# Patient Record
Sex: FEMALE | Race: WHITE | NOT HISPANIC OR LATINO | Employment: OTHER | URBAN - METROPOLITAN AREA
[De-identification: names, ages, dates, MRNs, and addresses within clinical notes are randomized per-mention and may not be internally consistent; named-entity substitution may affect disease eponyms.]

---

## 2019-08-12 ENCOUNTER — OFFICE VISIT (OUTPATIENT)
Dept: CARDIAC SURGERY | Facility: CLINIC | Age: 59
End: 2019-08-12

## 2019-08-12 VITALS
OXYGEN SATURATION: 94 % | SYSTOLIC BLOOD PRESSURE: 150 MMHG | WEIGHT: 178 LBS | TEMPERATURE: 98.9 F | BODY MASS INDEX: 38.4 KG/M2 | HEART RATE: 64 BPM | DIASTOLIC BLOOD PRESSURE: 75 MMHG | HEIGHT: 57 IN

## 2019-08-12 DIAGNOSIS — R91.1 LUNG NODULE: Primary | ICD-10-CM

## 2019-08-12 PROCEDURE — 99205 OFFICE O/P NEW HI 60 MIN: CPT | Performed by: THORACIC SURGERY (CARDIOTHORACIC VASCULAR SURGERY)

## 2019-08-12 RX ORDER — FLUTICASONE PROPIONATE 50 MCG
1 SPRAY, SUSPENSION (ML) NASAL EVERY MORNING
COMMUNITY
Start: 2019-05-13 | End: 2021-07-16

## 2019-08-12 RX ORDER — HYDROCODONE BITARTRATE AND ACETAMINOPHEN 10; 325 MG/1; MG/1
TABLET ORAL
Refills: 0 | COMMUNITY
Start: 2019-07-08

## 2019-08-12 RX ORDER — MONTELUKAST SODIUM 10 MG/1
10 TABLET ORAL NIGHTLY
COMMUNITY
Start: 2019-07-25 | End: 2021-07-16

## 2019-08-12 RX ORDER — DOCUSATE SODIUM 250 MG
250 CAPSULE ORAL NIGHTLY
COMMUNITY
End: 2021-07-16

## 2019-08-12 RX ORDER — CITALOPRAM 20 MG/1
20 TABLET ORAL EVERY MORNING
COMMUNITY
Start: 2019-07-25 | End: 2021-03-29

## 2019-08-12 RX ORDER — PRAMIPEXOLE DIHYDROCHLORIDE 0.25 MG/1
0.5 TABLET ORAL NIGHTLY PRN
COMMUNITY
Start: 2019-06-04 | End: 2021-07-16

## 2019-08-12 RX ORDER — KRILL/OM-3/DHA/EPA/PHOSPHO/AST 500-110 MG
520 CAPSULE ORAL EVERY MORNING
COMMUNITY
End: 2021-03-29

## 2019-08-12 RX ORDER — POTASSIUM CHLORIDE 750 MG/1
10 CAPSULE, EXTENDED RELEASE ORAL 2 TIMES DAILY
COMMUNITY

## 2019-08-12 RX ORDER — PANTOPRAZOLE SODIUM 20 MG/1
40 TABLET, DELAYED RELEASE ORAL EVERY MORNING
COMMUNITY
Start: 2019-07-02 | End: 2021-08-13

## 2019-08-12 RX ORDER — MELOXICAM 15 MG/1
15 TABLET ORAL DAILY
COMMUNITY
Start: 2019-06-04 | End: 2021-07-16

## 2019-08-12 RX ORDER — CHOLECALCIFEROL (VITAMIN D3) 125 MCG
2000 CAPSULE ORAL DAILY
COMMUNITY

## 2019-08-12 RX ORDER — RANITIDINE 150 MG/1
150 TABLET ORAL AS NEEDED
COMMUNITY
Start: 2019-07-25 | End: 2021-03-29

## 2019-08-12 RX ORDER — FUROSEMIDE 20 MG/1
20 TABLET ORAL EVERY MORNING
COMMUNITY
Start: 2019-06-04

## 2019-08-12 NOTE — PROGRESS NOTES
08/12/2019  Patient Information  Fabiana Brody                                                                                          [address] [facility] [address]   1960  'PCP/Referring Physician'  Andrea Lowery MD  [phone]  Marry Colunga,*  [phone]  Chief Complaint   Patient presents with   • Consult     referred by Dr. Marry Colunga   • Lung Nodule       History of Present Illness:   The patient is a 59-year-old white female who has been a heavy smoker throughout her entire life.  She had a screening CT done approximately 6 months ago which revealed a 0.6 cm right lower lobe nodule.  This has been re-x-rayed and has grown to 1.1 cm.  The PET scan is intensely positive with SUV greater than 6.  She quit smoking less than a month ago.  She has been referred to me for consideration of surgical management of this.  She denies hemoptysis.  She denies exposure to carcinogens other than tobacco.  She denies a history of exposure to infectious disease.      Patient Active Problem List   Diagnosis   • Lung nodule     Past Medical History:   Diagnosis Date   • Anxiety    • Arthritis    • Cancer (CMS/HCC)    • Depression    • Diabetes mellitus (CMS/HCC)     type 2   • GERD (gastroesophageal reflux disease)    • Hypertension    • Peptic ulceration    • Pneumonia    • Sleep apnea      Past Surgical History:   Procedure Laterality Date   • CATARACT EXTRACTION, BILATERAL     • CHOLECYSTECTOMY     • FETAL SURGERY FOR CONGENITAL HERNIA      unbilical   • JOINT REPLACEMENT Left     complete   • OTHER SURGICAL HISTORY      begign basal cell carcinoma removed from nose   • OTHER SURGICAL HISTORY      added plate & pins to right elbow to keep in place   • OTHER SURGICAL HISTORY      had bladder stim battery replaced   • POLYPECTOMY      removed from vocal cords & membranes stripped from them       Current Outpatient Medications:   •  carbidopa-levodopa (SINEMET)  MG per tablet, , Disp: ,  Rfl:   •  cholecalciferol (VITAMIN D3) 1000 units tablet, Take 2,000 Units by mouth Daily., Disp: , Rfl:   •  citalopram (CeleXA) 20 MG tablet, , Disp: , Rfl:   •  Cranberry 1000 MG capsule, Take 2,000 mg by mouth., Disp: , Rfl:   •  docusate sodium (COLACE) 250 MG capsule, Take 250 mg by mouth Daily., Disp: , Rfl:   •  fluticasone (FLONASE) 50 MCG/ACT nasal spray, , Disp: , Rfl:   •  furosemide (LASIX) 20 MG tablet, , Disp: , Rfl:   •  HYDROcodone-acetaminophen (NORCO)  MG per tablet, TAKE 1 TABLET BY MOUTH EVERY 8 HOURS AS NEEDED FOR 30 DAYS, Disp: , Rfl: 0  •  Krill Oil (OMEGA-3) 500 MG capsule, Take 520 mg by mouth., Disp: , Rfl:   •  meloxicam (MOBIC) 15 MG tablet, , Disp: , Rfl:   •  montelukast (SINGULAIR) 10 MG tablet, , Disp: , Rfl:   •  pantoprazole (PROTONIX) 20 MG EC tablet, , Disp: , Rfl:   •  potassium chloride (MICRO-K) 8 MEQ CR capsule, Take 10 mEq by mouth 2 (Two) Times a Day., Disp: , Rfl:   •  pramipexole (MIRAPEX) 0.25 MG tablet, , Disp: , Rfl:   •  raNITIdine (ZANTAC) 150 MG tablet, , Disp: , Rfl:   •  Red Yeast Rice Extract (RED YEAST RICE PO), Take  by mouth., Disp: , Rfl:   Allergies   Allergen Reactions   • Erythromycin Hives and Nausea Only   • Penicillins Hives and Nausea Only   • Pennsaid [Diclofenac Sodium] Other (See Comments)     Skin peels, burns & rolls off.   • Tape Other (See Comments)     Tears skin and creates wounds   • Tetracyclines & Related Hives and Nausea Only     Social History     Socioeconomic History   • Marital status:      Spouse name: Not on file   • Number of children: 0   • Years of education: Not on file   • Highest education level: Not on file   Occupational History     Employer: DISABLED   Tobacco Use   • Smoking status: Former Smoker     Packs/day: 1.50     Last attempt to quit: 2019     Years since quittin.0   • Tobacco comment: 40 + years   Substance and Sexual Activity   • Alcohol use: No     Frequency: Never   • Drug use: No   Social  History Narrative    Lives in [place], [place]     Family History   Problem Relation Age of Onset   • Diabetes Mother    • Heart failure Mother    • Cancer Mother    • Hypertension Mother    • Depression Mother    • Urinary tract infection Mother    • Stroke Mother    • Stroke Father    • Cancer Father    • Hypertension Father    • Kidney disease Father      Review of Systems   Constitution: Positive for malaise/fatigue. Negative for chills, diaphoresis, fever, weakness, night sweats, weight gain and weight loss.   HENT: Positive for hearing loss (bilateral). Negative for congestion, ear pain, nosebleeds and sore throat.    Eyes: Negative for blurred vision and double vision.   Cardiovascular: Positive for leg swelling. Negative for chest pain, claudication, dyspnea on exertion, near-syncope, palpitations and syncope.   Respiratory: Positive for cough and shortness of breath. Negative for hemoptysis and wheezing.    Hematologic/Lymphatic: Does not bruise/bleed easily.   Skin: Positive for itching. Negative for poor wound healing and rash.   Musculoskeletal: Positive for arthritis. Negative for back pain, falls, joint pain, joint swelling, muscle cramps, muscle weakness and neck pain.   Gastrointestinal: Positive for abdominal pain. Negative for constipation, diarrhea, dysphagia, hematochezia, nausea and vomiting.   Genitourinary: Positive for bladder incontinence. Negative for frequency, hematuria, hesitancy, incomplete emptying and urgency.   Neurological: Positive for loss of balance and tremors. Negative for dizziness, headaches, light-headedness, numbness and seizures.   Psychiatric/Behavioral: Positive for depression. Negative for suicidal ideas. The patient is nervous/anxious.    Allergic/Immunologic: Positive for environmental allergies. Negative for HIV exposure.     Vitals:    08/12/19 1343   BP: 150/75   BP Location: Right arm   Patient Position: Sitting   Pulse: 64   Temp: 98.9 °F (37.2 °C)   SpO2: 94%  "  Weight: 80.7 kg (178 lb)   Height: 144.8 cm (57\")      Physical Exam   Constitutional: She is oriented to person, place, and time. She appears well-developed and well-nourished. No distress.   HENT:   Head: Normocephalic.   Eyes: EOM are normal. Pupils are equal, round, and reactive to light.   Neck: Normal range of motion. Carotid bruit is not present. No thyromegaly present.   Cardiovascular: Normal rate and regular rhythm. Exam reveals no gallop and no friction rub.   No murmur heard.  Pulmonary/Chest: She has no wheezes. She has no rales.   Abdominal: Soft. Bowel sounds are normal. She exhibits no distension and no mass. There is no hepatomegaly. There is no tenderness.   Musculoskeletal: Normal range of motion. She exhibits no deformity.   Neurological: She is alert and oriented to person, place, and time. She has normal strength. No cranial nerve deficit or sensory deficit.   Skin: No bruising and no petechiae noted. No cyanosis. Nails show no clubbing.   Psychiatric: She has a normal mood and affect.     Labs/Imaging:  I obtained and reviewed medical records from Dr. Colunga including her CT scan as well as her PET scan.    Assessment/Plan:   The patient is a 59 white female referred for right lower lobe mass.  I have obtained and reviewed her CT scan, as well as her PET scan.  I concur with the radiologist that she has a 1.1 cm right lower lobe mass, which is intensely positive on PET scan.  This certainly is very concerning for malignancy, particular with her smoking history.  I think it has to be surgically excised.  I do not think a needle biopsy will add anything to the situation.  Her PET scan reveals no evidence of any spread.  I have explained the operation, risks  and alternatives.  She is in a wheelchair a great deal of the time.  Obviously she will be an elevated surgical risk, but I think that this is her best option.  She appears to understand the risks which includes risk to her life, " bleeding, infection, organ failure, chronic pain, and other risk factors.  She desires to proceed.    Patient Active Problem List   Diagnosis   • Lung nodule     CC: MD Marry Mae MD Debbie Moore, , editing for Phuc Cox M.D.    I, Phuc Cox MD, have read and agree with the editing done by Kerry Padilla, .

## 2019-08-13 ENCOUNTER — PREP FOR SURGERY (OUTPATIENT)
Dept: OTHER | Facility: HOSPITAL | Age: 59
End: 2019-08-13

## 2019-08-13 DIAGNOSIS — R91.1 LUNG NODULE: Primary | ICD-10-CM

## 2019-08-13 DIAGNOSIS — Z00.6 EXAMINATION FOR NORMAL COMPARISON FOR CLINICAL RESEARCH: Primary | ICD-10-CM

## 2019-08-13 RX ORDER — CHLORHEXIDINE GLUCONATE 500 MG/1
1 CLOTH TOPICAL EVERY 12 HOURS PRN
Status: CANCELLED | OUTPATIENT
Start: 2019-08-28

## 2019-08-28 ENCOUNTER — APPOINTMENT (OUTPATIENT)
Dept: PREADMISSION TESTING | Facility: HOSPITAL | Age: 59
End: 2019-08-28

## 2019-09-02 ENCOUNTER — APPOINTMENT (OUTPATIENT)
Dept: PREADMISSION TESTING | Facility: HOSPITAL | Age: 59
End: 2019-09-02

## 2019-09-02 ENCOUNTER — ANESTHESIA EVENT (OUTPATIENT)
Dept: PERIOP | Facility: HOSPITAL | Age: 59
End: 2019-09-02

## 2019-09-02 ENCOUNTER — HOSPITAL ENCOUNTER (OUTPATIENT)
Dept: GENERAL RADIOLOGY | Facility: HOSPITAL | Age: 59
Discharge: HOME | End: 2019-09-02
Admitting: PHYSICIAN ASSISTANT

## 2019-09-02 VITALS — WEIGHT: 178.6 LBS | BODY MASS INDEX: 37.49 KG/M2 | HEIGHT: 58 IN | OXYGEN SATURATION: 95 %

## 2019-09-02 DIAGNOSIS — R91.1 LUNG NODULE: ICD-10-CM

## 2019-09-02 LAB
ABO GROUP BLD: NORMAL
ALBUMIN SERPL-MCNC: 4.5 G/DL (ref 3.5–5.2)
ALP SERPL-CCNC: 140 U/L (ref 39–117)
ALT SERPL W P-5'-P-CCNC: 6 U/L (ref 1–33)
ANION GAP SERPL CALCULATED.3IONS-SCNC: 11 MMOL/L (ref 5–15)
AST SERPL-CCNC: 16 U/L (ref 1–32)
BASOPHILS # BLD AUTO: 0.03 10*3/MM3 (ref 0–0.2)
BASOPHILS NFR BLD AUTO: 0.4 % (ref 0–1.5)
BILIRUB CONJ SERPL-MCNC: <0.2 MG/DL (ref 0.2–0.3)
BILIRUB INDIRECT SERPL-MCNC: ABNORMAL MG/DL
BILIRUB SERPL-MCNC: 0.3 MG/DL (ref 0.2–1.2)
BLD GP AB SCN SERPL QL: NEGATIVE
BUN BLD-MCNC: 16 MG/DL (ref 8–23)
BUN/CREAT SERPL: 21.9 (ref 7–25)
CALCIUM SPEC-SCNC: 9.3 MG/DL (ref 8.6–10.5)
CHLORIDE SERPL-SCNC: 96 MMOL/L (ref 98–107)
CO2 SERPL-SCNC: 26 MMOL/L (ref 22–29)
CREAT BLD-MCNC: 0.73 MG/DL (ref 0.57–1)
DEPRECATED RDW RBC AUTO: 44.2 FL (ref 37–54)
EOSINOPHIL # BLD AUTO: 0.27 10*3/MM3 (ref 0–0.4)
EOSINOPHIL NFR BLD AUTO: 3.5 % (ref 0.3–6.2)
ERYTHROCYTE [DISTWIDTH] IN BLOOD BY AUTOMATED COUNT: 13.5 % (ref 12.3–15.4)
GFR SERPL CREATININE-BSD FRML MDRD: 81 ML/MIN/1.73
GLUCOSE BLD-MCNC: 90 MG/DL (ref 65–99)
HBA1C MFR BLD: 5.6 % (ref 4.8–5.6)
HCT VFR BLD AUTO: 38 % (ref 34–46.6)
HGB BLD-MCNC: 12.6 G/DL (ref 12–15.9)
IMM GRANULOCYTES # BLD AUTO: 0.03 10*3/MM3 (ref 0–0.05)
IMM GRANULOCYTES NFR BLD AUTO: 0.4 % (ref 0–0.5)
INR PPP: 1.02 (ref 0.85–1.16)
LYMPHOCYTES # BLD AUTO: 2.36 10*3/MM3 (ref 0.7–3.1)
LYMPHOCYTES NFR BLD AUTO: 30.6 % (ref 19.6–45.3)
MCH RBC QN AUTO: 29.5 PG (ref 26.6–33)
MCHC RBC AUTO-ENTMCNC: 33.2 G/DL (ref 31.5–35.7)
MCV RBC AUTO: 89 FL (ref 79–97)
MONOCYTES # BLD AUTO: 0.79 10*3/MM3 (ref 0.1–0.9)
MONOCYTES NFR BLD AUTO: 10.2 % (ref 5–12)
NEUTROPHILS # BLD AUTO: 4.23 10*3/MM3 (ref 1.7–7)
NEUTROPHILS NFR BLD AUTO: 54.9 % (ref 42.7–76)
NRBC BLD AUTO-RTO: 0 /100 WBC (ref 0–0.2)
PLATELET # BLD AUTO: 246 10*3/MM3 (ref 140–450)
PMV BLD AUTO: 8.8 FL (ref 6–12)
POTASSIUM BLD-SCNC: 4.6 MMOL/L (ref 3.5–5.2)
PROT SERPL-MCNC: 6.5 G/DL (ref 6–8.5)
PROTHROMBIN TIME: 12.9 SECONDS (ref 11.2–14.3)
RBC # BLD AUTO: 4.27 10*6/MM3 (ref 3.77–5.28)
RH BLD: POSITIVE
SODIUM BLD-SCNC: 133 MMOL/L (ref 136–145)
T&S EXPIRATION DATE: NORMAL
WBC NRBC COR # BLD: 7.71 10*3/MM3 (ref 3.4–10.8)

## 2019-09-02 PROCEDURE — 93005 ELECTROCARDIOGRAM TRACING: CPT

## 2019-09-02 PROCEDURE — 86850 RBC ANTIBODY SCREEN: CPT | Performed by: PHYSICIAN ASSISTANT

## 2019-09-02 PROCEDURE — 93010 ELECTROCARDIOGRAM REPORT: CPT | Performed by: INTERNAL MEDICINE

## 2019-09-02 PROCEDURE — 85025 COMPLETE CBC W/AUTO DIFF WBC: CPT | Performed by: PHYSICIAN ASSISTANT

## 2019-09-02 PROCEDURE — 36415 COLL VENOUS BLD VENIPUNCTURE: CPT

## 2019-09-02 PROCEDURE — 83036 HEMOGLOBIN GLYCOSYLATED A1C: CPT | Performed by: PHYSICIAN ASSISTANT

## 2019-09-02 PROCEDURE — 80076 HEPATIC FUNCTION PANEL: CPT | Performed by: PHYSICIAN ASSISTANT

## 2019-09-02 PROCEDURE — 71046 X-RAY EXAM CHEST 2 VIEWS: CPT

## 2019-09-02 PROCEDURE — 85610 PROTHROMBIN TIME: CPT | Performed by: PHYSICIAN ASSISTANT

## 2019-09-02 PROCEDURE — 86901 BLOOD TYPING SEROLOGIC RH(D): CPT | Performed by: PHYSICIAN ASSISTANT

## 2019-09-02 PROCEDURE — 86900 BLOOD TYPING SEROLOGIC ABO: CPT | Performed by: PHYSICIAN ASSISTANT

## 2019-09-02 PROCEDURE — 80048 BASIC METABOLIC PNL TOTAL CA: CPT | Performed by: PHYSICIAN ASSISTANT

## 2019-09-02 PROCEDURE — 86923 COMPATIBILITY TEST ELECTRIC: CPT

## 2019-09-02 RX ORDER — FAMOTIDINE 10 MG/ML
20 INJECTION, SOLUTION INTRAVENOUS ONCE
Status: CANCELLED | OUTPATIENT
Start: 2019-09-02 | End: 2019-09-02

## 2019-09-02 RX ORDER — CETIRIZINE HYDROCHLORIDE 10 MG/1
10 TABLET ORAL NIGHTLY
COMMUNITY
End: 2021-08-13

## 2019-09-02 RX ORDER — ALBUTEROL SULFATE 90 UG/1
2 AEROSOL, METERED RESPIRATORY (INHALATION) EVERY 4 HOURS PRN
COMMUNITY

## 2019-09-02 RX ORDER — CYCLOBENZAPRINE HCL 10 MG
10 TABLET ORAL 3 TIMES DAILY PRN
COMMUNITY

## 2019-09-03 ENCOUNTER — APPOINTMENT (OUTPATIENT)
Dept: GENERAL RADIOLOGY | Facility: HOSPITAL | Age: 59
End: 2019-09-03

## 2019-09-03 ENCOUNTER — APPOINTMENT (OUTPATIENT)
Dept: PULMONOLOGY | Facility: HOSPITAL | Age: 59
End: 2019-09-03

## 2019-09-03 ENCOUNTER — HOSPITAL ENCOUNTER (INPATIENT)
Facility: HOSPITAL | Age: 59
LOS: 6 days | Discharge: HOME HEALTH | End: 2019-09-09
Attending: THORACIC SURGERY (CARDIOTHORACIC VASCULAR SURGERY) | Admitting: THORACIC SURGERY (CARDIOTHORACIC VASCULAR SURGERY)

## 2019-09-03 ENCOUNTER — ANESTHESIA (OUTPATIENT)
Dept: PERIOP | Facility: HOSPITAL | Age: 59
End: 2019-09-03

## 2019-09-03 DIAGNOSIS — C34.91 SQUAMOUS CELL CARCINOMA OF LUNG, STAGE I, RIGHT (HCC): ICD-10-CM

## 2019-09-03 DIAGNOSIS — R91.1 LUNG NODULE: ICD-10-CM

## 2019-09-03 DIAGNOSIS — J44.9 CHRONIC OBSTRUCTIVE PULMONARY DISEASE, UNSPECIFIED COPD TYPE (HCC): Primary | ICD-10-CM

## 2019-09-03 PROBLEM — E66.9 CLASS 2 OBESITY IN ADULT: Status: ACTIVE | Noted: 2019-09-03

## 2019-09-03 PROBLEM — G47.33 OSA (OBSTRUCTIVE SLEEP APNEA): Status: ACTIVE | Noted: 2019-09-03

## 2019-09-03 PROBLEM — Z78.9 ELECTRONIC CIGARETTE USE: Status: ACTIVE | Noted: 2019-09-03

## 2019-09-03 PROBLEM — Z99.3 WHEELCHAIR DEPENDENCE: Status: ACTIVE | Noted: 2019-09-03

## 2019-09-03 PROBLEM — Z72.0 TOBACCO USE: Status: ACTIVE | Noted: 2019-09-03

## 2019-09-03 PROBLEM — I10 HYPERTENSION: Status: ACTIVE | Noted: 2019-09-03

## 2019-09-03 LAB
ABO GROUP BLD: NORMAL
GLUCOSE BLDC GLUCOMTR-MCNC: 105 MG/DL (ref 70–130)
GLUCOSE BLDC GLUCOMTR-MCNC: 173 MG/DL (ref 70–130)
RH BLD: POSITIVE

## 2019-09-03 PROCEDURE — 86900 BLOOD TYPING SEROLOGIC ABO: CPT

## 2019-09-03 PROCEDURE — 0BTF0ZZ RESECTION OF RIGHT LOWER LUNG LOBE, OPEN APPROACH: ICD-10-PCS | Performed by: THORACIC SURGERY (CARDIOTHORACIC VASCULAR SURGERY)

## 2019-09-03 PROCEDURE — 25010000002 DEXAMETHASONE PER 1 MG: Performed by: NURSE ANESTHETIST, CERTIFIED REGISTERED

## 2019-09-03 PROCEDURE — 0BJ08ZZ INSPECTION OF TRACHEOBRONCHIAL TREE, VIA NATURAL OR ARTIFICIAL OPENING ENDOSCOPIC: ICD-10-PCS | Performed by: THORACIC SURGERY (CARDIOTHORACIC VASCULAR SURGERY)

## 2019-09-03 PROCEDURE — 32480 PARTIAL REMOVAL OF LUNG: CPT | Performed by: THORACIC SURGERY (CARDIOTHORACIC VASCULAR SURGERY)

## 2019-09-03 PROCEDURE — 07B70ZX EXCISION OF THORAX LYMPHATIC, OPEN APPROACH, DIAGNOSTIC: ICD-10-PCS | Performed by: THORACIC SURGERY (CARDIOTHORACIC VASCULAR SURGERY)

## 2019-09-03 PROCEDURE — 86901 BLOOD TYPING SEROLOGIC RH(D): CPT

## 2019-09-03 PROCEDURE — 25010000003 MORPHINE PER 10 MG: Performed by: NURSE ANESTHETIST, CERTIFIED REGISTERED

## 2019-09-03 PROCEDURE — 25010000002 ONDANSETRON PER 1 MG: Performed by: NURSE ANESTHETIST, CERTIFIED REGISTERED

## 2019-09-03 PROCEDURE — 25010000002 VANCOMYCIN 10 G RECONSTITUTED SOLUTION: Performed by: PHYSICIAN ASSISTANT

## 2019-09-03 PROCEDURE — 88341 IMHCHEM/IMCYTCHM EA ADD ANTB: CPT | Performed by: THORACIC SURGERY (CARDIOTHORACIC VASCULAR SURGERY)

## 2019-09-03 PROCEDURE — 88334 PATH CONSLTJ SURG CYTO XM EA: CPT | Performed by: THORACIC SURGERY (CARDIOTHORACIC VASCULAR SURGERY)

## 2019-09-03 PROCEDURE — 82962 GLUCOSE BLOOD TEST: CPT

## 2019-09-03 PROCEDURE — 71045 X-RAY EXAM CHEST 1 VIEW: CPT

## 2019-09-03 PROCEDURE — 94010 BREATHING CAPACITY TEST: CPT | Performed by: INTERNAL MEDICINE

## 2019-09-03 PROCEDURE — 99232 SBSQ HOSP IP/OBS MODERATE 35: CPT | Performed by: INTERNAL MEDICINE

## 2019-09-03 PROCEDURE — 25010000002 MIDAZOLAM PER 1 MG: Performed by: NURSE ANESTHETIST, CERTIFIED REGISTERED

## 2019-09-03 PROCEDURE — 0BBF0ZZ EXCISION OF RIGHT LOWER LUNG LOBE, OPEN APPROACH: ICD-10-PCS | Performed by: THORACIC SURGERY (CARDIOTHORACIC VASCULAR SURGERY)

## 2019-09-03 PROCEDURE — 25010000002 MORPHINE PER 10 MG: Performed by: THORACIC SURGERY (CARDIOTHORACIC VASCULAR SURGERY)

## 2019-09-03 PROCEDURE — C1729 CATH, DRAINAGE: HCPCS | Performed by: THORACIC SURGERY (CARDIOTHORACIC VASCULAR SURGERY)

## 2019-09-03 PROCEDURE — 94010 BREATHING CAPACITY TEST: CPT

## 2019-09-03 PROCEDURE — 88307 TISSUE EXAM BY PATHOLOGIST: CPT | Performed by: THORACIC SURGERY (CARDIOTHORACIC VASCULAR SURGERY)

## 2019-09-03 PROCEDURE — 88342 IMHCHEM/IMCYTCHM 1ST ANTB: CPT | Performed by: THORACIC SURGERY (CARDIOTHORACIC VASCULAR SURGERY)

## 2019-09-03 PROCEDURE — 88331 PATH CONSLTJ SURG 1 BLK 1SPC: CPT | Performed by: PATHOLOGY

## 2019-09-03 PROCEDURE — 25010000002 MORPHINE PER 10 MG: Performed by: NURSE ANESTHETIST, CERTIFIED REGISTERED

## 2019-09-03 PROCEDURE — 25010000002 PHENYLEPHRINE PER 1 ML: Performed by: NURSE ANESTHETIST, CERTIFIED REGISTERED

## 2019-09-03 PROCEDURE — 94799 UNLISTED PULMONARY SVC/PX: CPT

## 2019-09-03 PROCEDURE — 38746 REMOVE THORACIC LYMPH NODES: CPT | Performed by: THORACIC SURGERY (CARDIOTHORACIC VASCULAR SURGERY)

## 2019-09-03 PROCEDURE — C1755 CATHETER, INTRASPINAL: HCPCS | Performed by: ANESTHESIOLOGY

## 2019-09-03 PROCEDURE — 32507 WEDGE RESECT OF LUNG DIAG: CPT | Performed by: THORACIC SURGERY (CARDIOTHORACIC VASCULAR SURGERY)

## 2019-09-03 PROCEDURE — 88309 TISSUE EXAM BY PATHOLOGIST: CPT | Performed by: THORACIC SURGERY (CARDIOTHORACIC VASCULAR SURGERY)

## 2019-09-03 PROCEDURE — 25010000002 FENTANYL CITRATE (PF) 100 MCG/2ML SOLUTION: Performed by: NURSE ANESTHETIST, CERTIFIED REGISTERED

## 2019-09-03 PROCEDURE — 31622 DX BRONCHOSCOPE/WASH: CPT | Performed by: THORACIC SURGERY (CARDIOTHORACIC VASCULAR SURGERY)

## 2019-09-03 PROCEDURE — 25010000002 NEOSTIGMINE 10 MG/10ML SOLUTION: Performed by: NURSE ANESTHETIST, CERTIFIED REGISTERED

## 2019-09-03 PROCEDURE — 88305 TISSUE EXAM BY PATHOLOGIST: CPT | Performed by: THORACIC SURGERY (CARDIOTHORACIC VASCULAR SURGERY)

## 2019-09-03 PROCEDURE — 25010000002 PROPOFOL 10 MG/ML EMULSION: Performed by: NURSE ANESTHETIST, CERTIFIED REGISTERED

## 2019-09-03 DEVICE — ARTICULATING RELOAD WITH TRI-STAPLE TECHNOLOGY
Type: IMPLANTABLE DEVICE | Site: BRONCHUS | Status: FUNCTIONAL
Brand: ENDO GIA

## 2019-09-03 DEVICE — CURVED TIP INTELLIGENT RELOAD AND INTRODUCER
Type: IMPLANTABLE DEVICE | Site: BRONCHUS | Status: FUNCTIONAL
Brand: TRI-STAPLE 2.0

## 2019-09-03 DEVICE — STAPLER WITH DST SERIES TECHNOLOGY
Type: IMPLANTABLE DEVICE | Site: BRONCHUS | Status: FUNCTIONAL
Brand: TA

## 2019-09-03 RX ORDER — ALBUTEROL SULFATE 2.5 MG/3ML
2.5 SOLUTION RESPIRATORY (INHALATION) ONCE AS NEEDED
Status: DISCONTINUED | OUTPATIENT
Start: 2019-09-03 | End: 2019-09-07

## 2019-09-03 RX ORDER — CETIRIZINE HYDROCHLORIDE 10 MG/1
10 TABLET ORAL NIGHTLY
Status: DISCONTINUED | OUTPATIENT
Start: 2019-09-03 | End: 2019-09-09 | Stop reason: HOSPADM

## 2019-09-03 RX ORDER — FAMOTIDINE 20 MG/1
20 TABLET, FILM COATED ORAL DAILY
Status: DISCONTINUED | OUTPATIENT
Start: 2019-09-03 | End: 2019-09-05

## 2019-09-03 RX ORDER — ONDANSETRON 2 MG/ML
4 INJECTION INTRAMUSCULAR; INTRAVENOUS EVERY 6 HOURS PRN
Status: DISCONTINUED | OUTPATIENT
Start: 2019-09-03 | End: 2019-09-09 | Stop reason: HOSPADM

## 2019-09-03 RX ORDER — SODIUM CHLORIDE 0.9 % (FLUSH) 0.9 %
10 SYRINGE (ML) INJECTION EVERY 12 HOURS SCHEDULED
Status: DISCONTINUED | OUTPATIENT
Start: 2019-09-03 | End: 2019-09-09 | Stop reason: HOSPADM

## 2019-09-03 RX ORDER — DEXAMETHASONE SODIUM PHOSPHATE 4 MG/ML
INJECTION, SOLUTION INTRA-ARTICULAR; INTRALESIONAL; INTRAMUSCULAR; INTRAVENOUS; SOFT TISSUE AS NEEDED
Status: DISCONTINUED | OUTPATIENT
Start: 2019-09-03 | End: 2019-09-03 | Stop reason: SURG

## 2019-09-03 RX ORDER — NEOSTIGMINE METHYLSULFATE 1 MG/ML
INJECTION, SOLUTION INTRAVENOUS AS NEEDED
Status: DISCONTINUED | OUTPATIENT
Start: 2019-09-03 | End: 2019-09-03 | Stop reason: SURG

## 2019-09-03 RX ORDER — MIDAZOLAM HYDROCHLORIDE 1 MG/ML
INJECTION INTRAMUSCULAR; INTRAVENOUS AS NEEDED
Status: DISCONTINUED | OUTPATIENT
Start: 2019-09-03 | End: 2019-09-03 | Stop reason: SURG

## 2019-09-03 RX ORDER — FAMOTIDINE 20 MG/1
20 TABLET, FILM COATED ORAL ONCE
Status: COMPLETED | OUTPATIENT
Start: 2019-09-03 | End: 2019-09-03

## 2019-09-03 RX ORDER — GLYCOPYRROLATE 0.2 MG/ML
INJECTION INTRAMUSCULAR; INTRAVENOUS AS NEEDED
Status: DISCONTINUED | OUTPATIENT
Start: 2019-09-03 | End: 2019-09-03 | Stop reason: SURG

## 2019-09-03 RX ORDER — HYDROMORPHONE HYDROCHLORIDE 1 MG/ML
0.25 INJECTION, SOLUTION INTRAMUSCULAR; INTRAVENOUS; SUBCUTANEOUS
Status: DISPENSED | OUTPATIENT
Start: 2019-09-03 | End: 2019-09-07

## 2019-09-03 RX ORDER — SODIUM CHLORIDE 0.9 % (FLUSH) 0.9 %
1-10 SYRINGE (ML) INJECTION AS NEEDED
Status: DISCONTINUED | OUTPATIENT
Start: 2019-09-03 | End: 2019-09-09 | Stop reason: HOSPADM

## 2019-09-03 RX ORDER — NALOXONE HCL 0.4 MG/ML
0.4 VIAL (ML) INJECTION
Status: DISCONTINUED | OUTPATIENT
Start: 2019-09-03 | End: 2019-09-09 | Stop reason: HOSPADM

## 2019-09-03 RX ORDER — FENTANYL CITRATE 50 UG/ML
50 INJECTION, SOLUTION INTRAMUSCULAR; INTRAVENOUS
Status: DISCONTINUED | OUTPATIENT
Start: 2019-09-03 | End: 2019-09-07

## 2019-09-03 RX ORDER — SODIUM CHLORIDE 9 MG/ML
30 INJECTION, SOLUTION INTRAVENOUS CONTINUOUS
Status: DISCONTINUED | OUTPATIENT
Start: 2019-09-03 | End: 2019-09-06

## 2019-09-03 RX ORDER — DOCUSATE SODIUM 100 MG/1
200 CAPSULE, LIQUID FILLED ORAL NIGHTLY
Status: DISCONTINUED | OUTPATIENT
Start: 2019-09-03 | End: 2019-09-09 | Stop reason: HOSPADM

## 2019-09-03 RX ORDER — CITALOPRAM 20 MG/1
20 TABLET ORAL EVERY MORNING
Status: DISCONTINUED | OUTPATIENT
Start: 2019-09-04 | End: 2019-09-09 | Stop reason: HOSPADM

## 2019-09-03 RX ORDER — MONTELUKAST SODIUM 10 MG/1
10 TABLET ORAL NIGHTLY
Status: DISCONTINUED | OUTPATIENT
Start: 2019-09-03 | End: 2019-09-09 | Stop reason: HOSPADM

## 2019-09-03 RX ORDER — LIDOCAINE HYDROCHLORIDE 10 MG/ML
INJECTION, SOLUTION EPIDURAL; INFILTRATION; INTRACAUDAL; PERINEURAL AS NEEDED
Status: DISCONTINUED | OUTPATIENT
Start: 2019-09-03 | End: 2019-09-03 | Stop reason: SURG

## 2019-09-03 RX ORDER — ONDANSETRON 4 MG/1
4 TABLET, FILM COATED ORAL EVERY 6 HOURS PRN
Status: DISCONTINUED | OUTPATIENT
Start: 2019-09-03 | End: 2019-09-07

## 2019-09-03 RX ORDER — FUROSEMIDE 20 MG/1
20 TABLET ORAL EVERY MORNING
Status: DISCONTINUED | OUTPATIENT
Start: 2019-09-04 | End: 2019-09-09 | Stop reason: HOSPADM

## 2019-09-03 RX ORDER — OXYCODONE HYDROCHLORIDE AND ACETAMINOPHEN 5; 325 MG/1; MG/1
1 TABLET ORAL EVERY 4 HOURS PRN
Status: DISCONTINUED | OUTPATIENT
Start: 2019-09-03 | End: 2019-09-09 | Stop reason: HOSPADM

## 2019-09-03 RX ORDER — ALBUTEROL SULFATE 2.5 MG/3ML
2.5 SOLUTION RESPIRATORY (INHALATION) EVERY 4 HOURS PRN
Status: DISCONTINUED | OUTPATIENT
Start: 2019-09-03 | End: 2019-09-09 | Stop reason: HOSPADM

## 2019-09-03 RX ORDER — SODIUM CHLORIDE 0.9 % (FLUSH) 0.9 %
3 SYRINGE (ML) INJECTION EVERY 12 HOURS SCHEDULED
Status: DISCONTINUED | OUTPATIENT
Start: 2019-09-03 | End: 2019-09-03 | Stop reason: HOSPADM

## 2019-09-03 RX ORDER — HYDROCODONE BITARTRATE AND ACETAMINOPHEN 7.5; 325 MG/1; MG/1
1 TABLET ORAL EVERY 6 HOURS PRN
Status: DISCONTINUED | OUTPATIENT
Start: 2019-09-03 | End: 2019-09-09 | Stop reason: HOSPADM

## 2019-09-03 RX ORDER — ONDANSETRON 2 MG/ML
4 INJECTION INTRAMUSCULAR; INTRAVENOUS EVERY 6 HOURS PRN
Status: DISCONTINUED | OUTPATIENT
Start: 2019-09-03 | End: 2019-09-07

## 2019-09-03 RX ORDER — LIDOCAINE HYDROCHLORIDE AND EPINEPHRINE 10; 10 MG/ML; UG/ML
INJECTION, SOLUTION INFILTRATION; PERINEURAL AS NEEDED
Status: DISCONTINUED | OUTPATIENT
Start: 2019-09-03 | End: 2019-09-03 | Stop reason: SURG

## 2019-09-03 RX ORDER — MAGNESIUM HYDROXIDE 1200 MG/15ML
LIQUID ORAL AS NEEDED
Status: DISCONTINUED | OUTPATIENT
Start: 2019-09-03 | End: 2019-09-03 | Stop reason: HOSPADM

## 2019-09-03 RX ORDER — FLUTICASONE PROPIONATE 50 MCG
1 SPRAY, SUSPENSION (ML) NASAL EVERY MORNING
Status: DISCONTINUED | OUTPATIENT
Start: 2019-09-04 | End: 2019-09-09 | Stop reason: HOSPADM

## 2019-09-03 RX ORDER — PANTOPRAZOLE SODIUM 40 MG/1
40 TABLET, DELAYED RELEASE ORAL EVERY MORNING
Status: DISCONTINUED | OUTPATIENT
Start: 2019-09-04 | End: 2019-09-09 | Stop reason: HOSPADM

## 2019-09-03 RX ORDER — SODIUM CHLORIDE 0.9 % (FLUSH) 0.9 %
3-10 SYRINGE (ML) INJECTION AS NEEDED
Status: DISCONTINUED | OUTPATIENT
Start: 2019-09-03 | End: 2019-09-03 | Stop reason: HOSPADM

## 2019-09-03 RX ORDER — POTASSIUM CHLORIDE 750 MG/1
10 CAPSULE, EXTENDED RELEASE ORAL DAILY
Status: DISCONTINUED | OUTPATIENT
Start: 2019-09-03 | End: 2019-09-09 | Stop reason: HOSPADM

## 2019-09-03 RX ORDER — SODIUM CHLORIDE, SODIUM LACTATE, POTASSIUM CHLORIDE, CALCIUM CHLORIDE 600; 310; 30; 20 MG/100ML; MG/100ML; MG/100ML; MG/100ML
9 INJECTION, SOLUTION INTRAVENOUS CONTINUOUS
Status: DISCONTINUED | OUTPATIENT
Start: 2019-09-03 | End: 2019-09-04

## 2019-09-03 RX ORDER — LIDOCAINE HYDROCHLORIDE 10 MG/ML
0.5 INJECTION, SOLUTION EPIDURAL; INFILTRATION; INTRACAUDAL; PERINEURAL ONCE AS NEEDED
Status: COMPLETED | OUTPATIENT
Start: 2019-09-03 | End: 2019-09-03

## 2019-09-03 RX ORDER — ONDANSETRON 2 MG/ML
INJECTION INTRAMUSCULAR; INTRAVENOUS AS NEEDED
Status: DISCONTINUED | OUTPATIENT
Start: 2019-09-03 | End: 2019-09-03 | Stop reason: SURG

## 2019-09-03 RX ORDER — ALBUTEROL SULFATE 2.5 MG/3ML
2.5 SOLUTION RESPIRATORY (INHALATION) EVERY 4 HOURS PRN
COMMUNITY

## 2019-09-03 RX ORDER — ALBUTEROL SULFATE 90 UG/1
2 AEROSOL, METERED RESPIRATORY (INHALATION) EVERY 4 HOURS PRN
Status: DISCONTINUED | OUTPATIENT
Start: 2019-09-03 | End: 2019-09-03 | Stop reason: SDUPTHER

## 2019-09-03 RX ORDER — FENTANYL CITRATE 50 UG/ML
INJECTION, SOLUTION INTRAMUSCULAR; INTRAVENOUS AS NEEDED
Status: DISCONTINUED | OUTPATIENT
Start: 2019-09-03 | End: 2019-09-03 | Stop reason: SURG

## 2019-09-03 RX ORDER — MORPHINE SULFATE 4 MG/ML
2 INJECTION, SOLUTION INTRAMUSCULAR; INTRAVENOUS EVERY 4 HOURS PRN
Status: DISCONTINUED | OUTPATIENT
Start: 2019-09-03 | End: 2019-09-09 | Stop reason: HOSPADM

## 2019-09-03 RX ORDER — PROPOFOL 10 MG/ML
VIAL (ML) INTRAVENOUS AS NEEDED
Status: DISCONTINUED | OUTPATIENT
Start: 2019-09-03 | End: 2019-09-03 | Stop reason: SURG

## 2019-09-03 RX ORDER — OXYBUTYNIN CHLORIDE 5 MG/1
10 TABLET, EXTENDED RELEASE ORAL DAILY
Status: DISCONTINUED | OUTPATIENT
Start: 2019-09-03 | End: 2019-09-09 | Stop reason: HOSPADM

## 2019-09-03 RX ORDER — MORPHINE SULFATE 0.5 MG/ML
INJECTION, SOLUTION EPIDURAL; INTRATHECAL; INTRAVENOUS AS NEEDED
Status: DISCONTINUED | OUTPATIENT
Start: 2019-09-03 | End: 2019-09-03 | Stop reason: SURG

## 2019-09-03 RX ORDER — ATRACURIUM BESYLATE 10 MG/ML
INJECTION, SOLUTION INTRAVENOUS AS NEEDED
Status: DISCONTINUED | OUTPATIENT
Start: 2019-09-03 | End: 2019-09-03 | Stop reason: SURG

## 2019-09-03 RX ADMIN — ATRACURIUM BESYLATE 50 MG: 10 INJECTION, SOLUTION INTRAVENOUS at 11:23

## 2019-09-03 RX ADMIN — CETIRIZINE HYDROCHLORIDE 10 MG: 10 TABLET, FILM COATED ORAL at 21:08

## 2019-09-03 RX ADMIN — OXYBUTYNIN CHLORIDE 10 MG: 5 TABLET, EXTENDED RELEASE ORAL at 16:30

## 2019-09-03 RX ADMIN — DEXAMETHASONE SODIUM PHOSPHATE 8 MG: 4 INJECTION, SOLUTION INTRAMUSCULAR; INTRAVENOUS at 11:54

## 2019-09-03 RX ADMIN — FAMOTIDINE 20 MG: 20 TABLET ORAL at 09:43

## 2019-09-03 RX ADMIN — SODIUM CHLORIDE, PRESERVATIVE FREE 3 ML: 5 INJECTION INTRAVENOUS at 16:05

## 2019-09-03 RX ADMIN — GLYCOPYRROLATE 0.4 MG: 0.2 INJECTION, SOLUTION INTRAMUSCULAR; INTRAVENOUS at 12:48

## 2019-09-03 RX ADMIN — LIDOCAINE HYDROCHLORIDE 0.5 ML: 10 INJECTION, SOLUTION EPIDURAL; INFILTRATION; INTRACAUDAL; PERINEURAL at 09:35

## 2019-09-03 RX ADMIN — SODIUM CHLORIDE 30 ML/HR: 9 INJECTION, SOLUTION INTRAVENOUS at 15:49

## 2019-09-03 RX ADMIN — PHENYLEPHRINE HYDROCHLORIDE 80 MCG: 10 INJECTION INTRAVENOUS at 12:05

## 2019-09-03 RX ADMIN — ONDANSETRON 4 MG: 2 INJECTION INTRAMUSCULAR; INTRAVENOUS at 12:41

## 2019-09-03 RX ADMIN — PROPOFOL 150 MG: 10 INJECTION, EMULSION INTRAVENOUS at 11:23

## 2019-09-03 RX ADMIN — HYDROCODONE BITARTRATE AND ACETAMINOPHEN 1 TABLET: 7.5; 325 TABLET ORAL at 22:38

## 2019-09-03 RX ADMIN — LIDOCAINE HYDROCHLORIDE,EPINEPHRINE BITARTRATE 3 ML: 10; .01 INJECTION, SOLUTION INFILTRATION; PERINEURAL at 10:24

## 2019-09-03 RX ADMIN — MIDAZOLAM HYDROCHLORIDE 2 MG: 1 INJECTION, SOLUTION INTRAMUSCULAR; INTRAVENOUS at 10:15

## 2019-09-03 RX ADMIN — SODIUM CHLORIDE, PRESERVATIVE FREE 10 ML: 5 INJECTION INTRAVENOUS at 21:10

## 2019-09-03 RX ADMIN — SODIUM CHLORIDE, PRESERVATIVE FREE 10 ML: 5 INJECTION INTRAVENOUS at 16:05

## 2019-09-03 RX ADMIN — CARBIDOPA AND LEVODOPA 1 TABLET: 25; 100 TABLET ORAL at 21:08

## 2019-09-03 RX ADMIN — SODIUM CHLORIDE, POTASSIUM CHLORIDE, SODIUM LACTATE AND CALCIUM CHLORIDE 9 ML/HR: 600; 310; 30; 20 INJECTION, SOLUTION INTRAVENOUS at 09:35

## 2019-09-03 RX ADMIN — NEOSTIGMINE METHYLSULFATE 3 MG: 1 INJECTION, SOLUTION INTRAVENOUS at 12:48

## 2019-09-03 RX ADMIN — MORPHINE SULFATE 2.5 MG: 0.5 INJECTION, SOLUTION EPIDURAL; INTRATHECAL; INTRAVENOUS at 11:55

## 2019-09-03 RX ADMIN — FAMOTIDINE 20 MG: 20 TABLET ORAL at 16:30

## 2019-09-03 RX ADMIN — LIDOCAINE HYDROCHLORIDE 3 ML: 10 INJECTION, SOLUTION EPIDURAL; INFILTRATION; INTRACAUDAL; PERINEURAL at 10:15

## 2019-09-03 RX ADMIN — SODIUM CHLORIDE, POTASSIUM CHLORIDE, SODIUM LACTATE AND CALCIUM CHLORIDE: 600; 310; 30; 20 INJECTION, SOLUTION INTRAVENOUS at 12:04

## 2019-09-03 RX ADMIN — BUPIVACAINE HYDROCHLORIDE: 5 INJECTION, SOLUTION EPIDURAL; INTRACAUDAL; PERINEURAL at 13:40

## 2019-09-03 RX ADMIN — PHENYLEPHRINE HYDROCHLORIDE 80 MCG: 10 INJECTION INTRAVENOUS at 12:47

## 2019-09-03 RX ADMIN — ATRACURIUM BESYLATE 10 MG: 10 INJECTION, SOLUTION INTRAVENOUS at 12:10

## 2019-09-03 RX ADMIN — PHENYLEPHRINE HYDROCHLORIDE 80 MCG: 10 INJECTION INTRAVENOUS at 11:36

## 2019-09-03 RX ADMIN — LIDOCAINE HYDROCHLORIDE 50 MG: 10 INJECTION, SOLUTION EPIDURAL; INFILTRATION; INTRACAUDAL; PERINEURAL at 11:23

## 2019-09-03 RX ADMIN — MUPIROCIN 1 APPLICATION: 20 OINTMENT TOPICAL at 09:43

## 2019-09-03 RX ADMIN — PHENYLEPHRINE HYDROCHLORIDE 80 MCG: 10 INJECTION INTRAVENOUS at 11:40

## 2019-09-03 RX ADMIN — VANCOMYCIN HYDROCHLORIDE 1250 MG: 10 INJECTION, POWDER, LYOPHILIZED, FOR SOLUTION INTRAVENOUS at 10:45

## 2019-09-03 RX ADMIN — HYDROCODONE BITARTRATE AND ACETAMINOPHEN 1 TABLET: 7.5; 325 TABLET ORAL at 16:04

## 2019-09-03 RX ADMIN — DOCUSATE SODIUM 200 MG: 100 CAPSULE, LIQUID FILLED ORAL at 21:08

## 2019-09-03 RX ADMIN — MONTELUKAST SODIUM 10 MG: 10 TABLET, COATED ORAL at 21:08

## 2019-09-03 RX ADMIN — MORPHINE SULFATE 2 MG: 4 INJECTION INTRAVENOUS at 21:09

## 2019-09-03 RX ADMIN — FENTANYL CITRATE 100 MCG: 50 INJECTION, SOLUTION INTRAMUSCULAR; INTRAVENOUS at 11:23

## 2019-09-03 NOTE — ANESTHESIA POSTPROCEDURE EVALUATION
Patient: Fabiana Brody    Procedure Summary     Date:  09/03/19 Room / Location:   [facility]    Anesthesia Start:  1115 Anesthesia Stop:      Procedure:  BRONCHOSCOPY RIGHT THORACOTOMY RIGHT LOWER LOBE WEDGE, COMPLETION RIGHT LOWER LOBE ECTOMY (Right Bronchus) Diagnosis:       Lung nodule      (Lung nodule [R91.1])    Surgeon:  Phuc Cox MD Provider:  Alexis Kelsey MD    Anesthesia Type:  general ASA Status:  3          Anesthesia Type: general  Last vitals  BP   96/69   Temp   97.35   Pulse   75   Resp   16     SpO2   95%     Post Anesthesia Care and Evaluation    Patient location during evaluation: PACU  Patient participation: complete - patient participated  Level of consciousness: awake  Pain score: 0  Pain management: adequate  Airway patency: patent  Anesthetic complications: No anesthetic complications  PONV Status: none  Cardiovascular status: acceptable and stable  Respiratory status: nasal cannula, unassisted, acceptable and spontaneous ventilation  Hydration status: acceptable

## 2019-09-03 NOTE — ANESTHESIA PREPROCEDURE EVALUATION
Anesthesia Evaluation     Patient summary reviewed and Nursing notes reviewed   no history of anesthetic complications:  NPO Solid Status: > 8 hours  NPO Liquid Status: > 8 hours           Airway   Mallampati: I  TM distance: >3 FB  Neck ROM: full  No difficulty expected  Dental    (+) edentulous    Pulmonary - normal exam   (+) a smoker Former, lung cancer, sleep apnea on CPAP,   Cardiovascular - normal exam  Exercise tolerance: good (4-7 METS)    ECG reviewed    (+) hypertension,   (-) valvular problems/murmurs, dysrhythmias, angina, SIBLEY      Neuro/Psych  (+) psychiatric history Anxiety and Depression,     (-) seizures, neuromuscular disease, TIA    ROS Comment: RLS  GI/Hepatic/Renal/Endo    (+) obesity,  GERD well controlled,  diabetes mellitus type 2 well controlled,   (-) hepatitis, liver disease, no renal disease, hypothyroidism    Musculoskeletal     Abdominal    Substance History      OB/GYN          Other   (+) arthritis   history of cancer                    Anesthesia Plan    ASA 3     general and epidural     intravenous induction   Anesthetic plan, all risks, benefits, and alternatives have been provided, discussed and informed consent has been obtained with: patient.  Use of blood products discussed with patient  Consented to blood products.   Plan discussed with CRNA.

## 2019-09-03 NOTE — ANESTHESIA PROCEDURE NOTES
Epidural Block    Pre-sedation assessment completed: 9/3/2019 10:15 AM    Patient reassessed immediately prior to procedure    Patient location during procedure: pre-op  Start Time: 9/3/2019 10:15 AM  Stop Time: 9/3/2019 10:27 AM  Indication:at surgeon's request and post-op pain management  Performed By  Anesthesiologist: Sofia Salas MD  Preanesthetic Checklist  Completed: patient identified, site marked, surgical consent, pre-op evaluation, timeout performed, IV checked, risks and benefits discussed and monitors and equipment checked  Additional Notes  Procedure:                                                                                   The pt. was placed in the sitting position and the legs placed over the side of bed in position of comfort.  The pt was instructed in optimal spine presentation and the insertion site was prepped and draped in sterile fashion.  The insertion site was anesthetized with 1% Lidocaine 2 ml.  The Epidural needle was placed without difficulty and Loss of Resistance was achieved. The labeled Epidural  Catheter was  secured at the insertion site with skin afix, mastisol,  steri strips and dressed with CHG Tegaderm.  Remaining catheter was taped to the back in a cephalad direction.  Test dose with Lidocaine 1.5% with Epinephrine 1:200,000 mcg 2ml was negative for intravascular injection  and negative for spinal injection.  Thank you.      Prep:  Pt Position:sitting  Sterile Tech:cap, gloves, mask and sterile barrier  Prep:chlorhexidine gluconate and isopropyl alcohol  Monitoring:blood pressure monitoring, continuous pulse oximetry and EKG  Epidural Block Procedure:  Approach:midline  Guidance:ultrasound guided, landmark technique and palpation technique  Location:thoracic  Level:5-6  Needle Type:Tuohy  Needle Gauge:18 G  Cath Size: 20 G  Loss of Resistance Medium: air  Loss of Resistance: 9cm  Cath Depth at skin:14 cm  Paresthesia: right  Aspiration:negative  Test  Dose:negative  Post Assessment:  Dressing:biopatch applied, occlusive dressing applied and secured with tape  Pt Tolerance:patient tolerated the procedure well with no apparent complications  Complications:no

## 2019-09-03 NOTE — ANESTHESIA PROCEDURE NOTES
Airway  Urgency: elective    Date/Time: 9/3/2019 11:25 AM  Airway not difficult    General Information and Staff    Patient location during procedure: OR  CRNA: Zuhair Sanchez CRNA    Indications and Patient Condition  Indications for airway management: airway protection    Preoxygenated: yes  Mask difficulty assessment: 2 - vent by mask + OA or adjuvant +/- NMBA    Final Airway Details  Final airway type: endotracheal airway      Successful airway: EBT - double lumen left  Cuffed: yes   Successful intubation technique: direct laryngoscopy  Facilitating devices/methods: intubating stylet  Endotracheal tube insertion site: oral  Blade: Carrera  Blade size: 2  EBT DL size (fr): 35  Cormack-Lehane Classification: grade I - full view of glottis  Placement verified by: chest auscultation and capnometry   Measured from: lips  ETT/EBT  to lips (cm): 27  Number of attempts at approach: 1    Additional Comments  Negative epigastric sounds, Breath sound equal bilaterally with symmetric chest rise and fall

## 2019-09-04 ENCOUNTER — APPOINTMENT (OUTPATIENT)
Dept: GENERAL RADIOLOGY | Facility: HOSPITAL | Age: 59
End: 2019-09-04

## 2019-09-04 LAB
ANION GAP SERPL CALCULATED.3IONS-SCNC: 11 MMOL/L (ref 5–15)
BASOPHILS # BLD AUTO: 0.03 10*3/MM3 (ref 0–0.2)
BASOPHILS NFR BLD AUTO: 0.3 % (ref 0–1.5)
BUN BLD-MCNC: 12 MG/DL (ref 8–23)
BUN/CREAT SERPL: 17.1 (ref 7–25)
CALCIUM SPEC-SCNC: 8.7 MG/DL (ref 8.6–10.5)
CHLORIDE SERPL-SCNC: 96 MMOL/L (ref 98–107)
CO2 SERPL-SCNC: 29 MMOL/L (ref 22–29)
CREAT BLD-MCNC: 0.7 MG/DL (ref 0.57–1)
DEPRECATED RDW RBC AUTO: 46.6 FL (ref 37–54)
EOSINOPHIL # BLD AUTO: 0.22 10*3/MM3 (ref 0–0.4)
EOSINOPHIL NFR BLD AUTO: 1.8 % (ref 0.3–6.2)
ERYTHROCYTE [DISTWIDTH] IN BLOOD BY AUTOMATED COUNT: 13.6 % (ref 12.3–15.4)
GFR SERPL CREATININE-BSD FRML MDRD: 85 ML/MIN/1.73
GLUCOSE BLD-MCNC: 137 MG/DL (ref 65–99)
GLUCOSE BLDC GLUCOMTR-MCNC: 82 MG/DL (ref 70–130)
HCT VFR BLD AUTO: 38.4 % (ref 34–46.6)
HGB BLD-MCNC: 12.2 G/DL (ref 12–15.9)
IMM GRANULOCYTES # BLD AUTO: 0.04 10*3/MM3 (ref 0–0.05)
IMM GRANULOCYTES NFR BLD AUTO: 0.3 % (ref 0–0.5)
LYMPHOCYTES # BLD AUTO: 1.34 10*3/MM3 (ref 0.7–3.1)
LYMPHOCYTES NFR BLD AUTO: 11.2 % (ref 19.6–45.3)
MAGNESIUM SERPL-MCNC: 1.8 MG/DL (ref 1.6–2.4)
MCH RBC QN AUTO: 29.6 PG (ref 26.6–33)
MCHC RBC AUTO-ENTMCNC: 31.8 G/DL (ref 31.5–35.7)
MCV RBC AUTO: 93.2 FL (ref 79–97)
MONOCYTES # BLD AUTO: 1.19 10*3/MM3 (ref 0.1–0.9)
MONOCYTES NFR BLD AUTO: 10 % (ref 5–12)
NEUTROPHILS # BLD AUTO: 9.1 10*3/MM3 (ref 1.7–7)
NEUTROPHILS NFR BLD AUTO: 76.4 % (ref 42.7–76)
NRBC BLD AUTO-RTO: 0 /100 WBC (ref 0–0.2)
PHOSPHATE SERPL-MCNC: 3.2 MG/DL (ref 2.5–4.5)
PLATELET # BLD AUTO: 231 10*3/MM3 (ref 140–450)
PMV BLD AUTO: 9 FL (ref 6–12)
POTASSIUM BLD-SCNC: 4.6 MMOL/L (ref 3.5–5.2)
RBC # BLD AUTO: 4.12 10*6/MM3 (ref 3.77–5.28)
SODIUM BLD-SCNC: 136 MMOL/L (ref 136–145)
WBC NRBC COR # BLD: 11.92 10*3/MM3 (ref 3.4–10.8)

## 2019-09-04 PROCEDURE — 25010000002 MORPHINE PER 10 MG: Performed by: NURSE ANESTHETIST, CERTIFIED REGISTERED

## 2019-09-04 PROCEDURE — 25010000002 HYDROMORPHONE PER 4 MG: Performed by: NURSE ANESTHETIST, CERTIFIED REGISTERED

## 2019-09-04 PROCEDURE — 80048 BASIC METABOLIC PNL TOTAL CA: CPT | Performed by: PHYSICIAN ASSISTANT

## 2019-09-04 PROCEDURE — 71045 X-RAY EXAM CHEST 1 VIEW: CPT

## 2019-09-04 PROCEDURE — 82962 GLUCOSE BLOOD TEST: CPT

## 2019-09-04 PROCEDURE — 94799 UNLISTED PULMONARY SVC/PX: CPT

## 2019-09-04 PROCEDURE — 84100 ASSAY OF PHOSPHORUS: CPT | Performed by: INTERNAL MEDICINE

## 2019-09-04 PROCEDURE — 85025 COMPLETE CBC W/AUTO DIFF WBC: CPT | Performed by: PHYSICIAN ASSISTANT

## 2019-09-04 PROCEDURE — 25010000002 VANCOMYCIN 10 G RECONSTITUTED SOLUTION: Performed by: PHYSICIAN ASSISTANT

## 2019-09-04 PROCEDURE — 83735 ASSAY OF MAGNESIUM: CPT | Performed by: INTERNAL MEDICINE

## 2019-09-04 PROCEDURE — 99232 SBSQ HOSP IP/OBS MODERATE 35: CPT | Performed by: INTERNAL MEDICINE

## 2019-09-04 PROCEDURE — 25010000002 MORPHINE PER 10 MG: Performed by: THORACIC SURGERY (CARDIOTHORACIC VASCULAR SURGERY)

## 2019-09-04 RX ORDER — MAGNESIUM SULFATE HEPTAHYDRATE 40 MG/ML
4 INJECTION, SOLUTION INTRAVENOUS AS NEEDED
Status: DISCONTINUED | OUTPATIENT
Start: 2019-09-04 | End: 2019-09-09 | Stop reason: HOSPADM

## 2019-09-04 RX ORDER — MAGNESIUM SULFATE HEPTAHYDRATE 40 MG/ML
2 INJECTION, SOLUTION INTRAVENOUS AS NEEDED
Status: DISCONTINUED | OUTPATIENT
Start: 2019-09-04 | End: 2019-09-09 | Stop reason: HOSPADM

## 2019-09-04 RX ORDER — MELOXICAM 7.5 MG/1
15 TABLET ORAL DAILY
Status: DISCONTINUED | OUTPATIENT
Start: 2019-09-04 | End: 2019-09-09 | Stop reason: HOSPADM

## 2019-09-04 RX ORDER — PRAMIPEXOLE DIHYDROCHLORIDE 0.25 MG/1
0.5 TABLET ORAL NIGHTLY PRN
Status: DISCONTINUED | OUTPATIENT
Start: 2019-09-04 | End: 2019-09-09 | Stop reason: HOSPADM

## 2019-09-04 RX ORDER — CYCLOBENZAPRINE HCL 10 MG
10 TABLET ORAL 3 TIMES DAILY PRN
Status: DISCONTINUED | OUTPATIENT
Start: 2019-09-04 | End: 2019-09-09 | Stop reason: HOSPADM

## 2019-09-04 RX ADMIN — CARBIDOPA AND LEVODOPA 1 TABLET: 25; 100 TABLET ORAL at 22:05

## 2019-09-04 RX ADMIN — CITALOPRAM HYDROBROMIDE 20 MG: 20 TABLET ORAL at 08:00

## 2019-09-04 RX ADMIN — SODIUM CHLORIDE, PRESERVATIVE FREE 10 ML: 5 INJECTION INTRAVENOUS at 22:15

## 2019-09-04 RX ADMIN — ALBUTEROL SULFATE 2.5 MG: 2.5 SOLUTION RESPIRATORY (INHALATION) at 21:34

## 2019-09-04 RX ADMIN — HYDROMORPHONE HYDROCHLORIDE 0.25 MG: 1 INJECTION, SOLUTION INTRAMUSCULAR; INTRAVENOUS; SUBCUTANEOUS at 00:16

## 2019-09-04 RX ADMIN — OXYBUTYNIN CHLORIDE 10 MG: 5 TABLET, EXTENDED RELEASE ORAL at 08:00

## 2019-09-04 RX ADMIN — DOCUSATE SODIUM 200 MG: 100 CAPSULE, LIQUID FILLED ORAL at 22:05

## 2019-09-04 RX ADMIN — FAMOTIDINE 20 MG: 20 TABLET ORAL at 08:16

## 2019-09-04 RX ADMIN — MONTELUKAST SODIUM 10 MG: 10 TABLET, COATED ORAL at 22:04

## 2019-09-04 RX ADMIN — VANCOMYCIN HYDROCHLORIDE 1250 MG: 10 INJECTION, POWDER, LYOPHILIZED, FOR SOLUTION INTRAVENOUS at 00:22

## 2019-09-04 RX ADMIN — POTASSIUM CHLORIDE 10 MEQ: 750 CAPSULE, EXTENDED RELEASE ORAL at 08:00

## 2019-09-04 RX ADMIN — HYDROCODONE BITARTRATE AND ACETAMINOPHEN 1 TABLET: 7.5; 325 TABLET ORAL at 07:56

## 2019-09-04 RX ADMIN — SODIUM CHLORIDE, PRESERVATIVE FREE 10 ML: 5 INJECTION INTRAVENOUS at 08:01

## 2019-09-04 RX ADMIN — PANTOPRAZOLE SODIUM 40 MG: 40 TABLET, DELAYED RELEASE ORAL at 05:40

## 2019-09-04 RX ADMIN — MORPHINE SULFATE 2 MG: 4 INJECTION INTRAVENOUS at 02:28

## 2019-09-04 RX ADMIN — HYDROCODONE BITARTRATE AND ACETAMINOPHEN 1 TABLET: 7.5; 325 TABLET ORAL at 22:13

## 2019-09-04 RX ADMIN — CETIRIZINE HYDROCHLORIDE 10 MG: 10 TABLET, FILM COATED ORAL at 22:04

## 2019-09-04 RX ADMIN — FLUTICASONE PROPIONATE 1 SPRAY: 50 SPRAY, METERED NASAL at 08:00

## 2019-09-04 RX ADMIN — FUROSEMIDE 20 MG: 20 TABLET ORAL at 08:08

## 2019-09-04 RX ADMIN — MAGNESIUM SULFATE HEPTAHYDRATE 4 G: 40 INJECTION, SOLUTION INTRAVENOUS at 14:24

## 2019-09-04 RX ADMIN — MELOXICAM 15 MG: 7.5 TABLET ORAL at 10:59

## 2019-09-04 RX ADMIN — BUPIVACAINE HYDROCHLORIDE: 5 INJECTION, SOLUTION EPIDURAL; INTRACAUDAL; PERINEURAL at 21:35

## 2019-09-05 ENCOUNTER — APPOINTMENT (OUTPATIENT)
Dept: GENERAL RADIOLOGY | Facility: HOSPITAL | Age: 59
End: 2019-09-05

## 2019-09-05 PROBLEM — C34.91: Status: ACTIVE | Noted: 2019-08-12

## 2019-09-05 LAB
ALBUMIN SERPL-MCNC: 2.8 G/DL (ref 3.5–5.2)
ANION GAP SERPL CALCULATED.3IONS-SCNC: 12 MMOL/L (ref 5–15)
BASOPHILS # BLD AUTO: 0.03 10*3/MM3 (ref 0–0.2)
BASOPHILS NFR BLD AUTO: 0.4 % (ref 0–1.5)
BUN BLD-MCNC: 10 MG/DL (ref 8–23)
BUN/CREAT SERPL: 16.1 (ref 7–25)
CALCIUM SPEC-SCNC: 8 MG/DL (ref 8.6–10.5)
CHLORIDE SERPL-SCNC: 94 MMOL/L (ref 98–107)
CO2 SERPL-SCNC: 20 MMOL/L (ref 22–29)
CREAT BLD-MCNC: 0.62 MG/DL (ref 0.57–1)
CYTO UR: NORMAL
DEPRECATED RDW RBC AUTO: 48.2 FL (ref 37–54)
EOSINOPHIL # BLD AUTO: 0.15 10*3/MM3 (ref 0–0.4)
EOSINOPHIL NFR BLD AUTO: 1.8 % (ref 0.3–6.2)
ERYTHROCYTE [DISTWIDTH] IN BLOOD BY AUTOMATED COUNT: 13.4 % (ref 12.3–15.4)
GFR SERPL CREATININE-BSD FRML MDRD: 98 ML/MIN/1.73
GLUCOSE BLD-MCNC: 114 MG/DL (ref 65–99)
HCT VFR BLD AUTO: 38.6 % (ref 34–46.6)
HGB BLD-MCNC: 11.8 G/DL (ref 12–15.9)
IMM GRANULOCYTES # BLD AUTO: 0.04 10*3/MM3 (ref 0–0.05)
IMM GRANULOCYTES NFR BLD AUTO: 0.5 % (ref 0–0.5)
LAB AP CASE REPORT: NORMAL
LAB AP CLINICAL INFORMATION: NORMAL
LAB AP DIAGNOSIS COMMENT: NORMAL
LYMPHOCYTES # BLD AUTO: 1.83 10*3/MM3 (ref 0.7–3.1)
LYMPHOCYTES NFR BLD AUTO: 22.5 % (ref 19.6–45.3)
Lab: NORMAL
MAGNESIUM SERPL-MCNC: 2.7 MG/DL (ref 1.6–2.4)
MCH RBC QN AUTO: 29.3 PG (ref 26.6–33)
MCHC RBC AUTO-ENTMCNC: 30.6 G/DL (ref 31.5–35.7)
MCV RBC AUTO: 95.8 FL (ref 79–97)
MONOCYTES # BLD AUTO: 0.6 10*3/MM3 (ref 0.1–0.9)
MONOCYTES NFR BLD AUTO: 7.4 % (ref 5–12)
NEUTROPHILS # BLD AUTO: 5.5 10*3/MM3 (ref 1.7–7)
NEUTROPHILS NFR BLD AUTO: 67.4 % (ref 42.7–76)
NRBC BLD AUTO-RTO: 0 /100 WBC (ref 0–0.2)
PATH REPORT.FINAL DX SPEC: NORMAL
PATH REPORT.GROSS SPEC: NORMAL
PHOSPHATE SERPL-MCNC: 2.4 MG/DL (ref 2.5–4.5)
PLATELET # BLD AUTO: 204 10*3/MM3 (ref 140–450)
PMV BLD AUTO: 9 FL (ref 6–12)
POTASSIUM BLD-SCNC: 3.9 MMOL/L (ref 3.5–5.2)
RBC # BLD AUTO: 4.03 10*6/MM3 (ref 3.77–5.28)
SODIUM BLD-SCNC: 126 MMOL/L (ref 136–145)
SODIUM BLD-SCNC: 129 MMOL/L (ref 136–145)
WBC NRBC COR # BLD: 8.15 10*3/MM3 (ref 3.4–10.8)

## 2019-09-05 PROCEDURE — 71045 X-RAY EXAM CHEST 1 VIEW: CPT

## 2019-09-05 PROCEDURE — 97162 PT EVAL MOD COMPLEX 30 MIN: CPT

## 2019-09-05 PROCEDURE — 83735 ASSAY OF MAGNESIUM: CPT | Performed by: INTERNAL MEDICINE

## 2019-09-05 PROCEDURE — 85025 COMPLETE CBC W/AUTO DIFF WBC: CPT | Performed by: INTERNAL MEDICINE

## 2019-09-05 PROCEDURE — 80069 RENAL FUNCTION PANEL: CPT | Performed by: INTERNAL MEDICINE

## 2019-09-05 PROCEDURE — 99232 SBSQ HOSP IP/OBS MODERATE 35: CPT | Performed by: INTERNAL MEDICINE

## 2019-09-05 PROCEDURE — 25010000002 HYDROMORPHONE PER 4 MG: Performed by: NURSE ANESTHETIST, CERTIFIED REGISTERED

## 2019-09-05 PROCEDURE — 84295 ASSAY OF SERUM SODIUM: CPT | Performed by: INTERNAL MEDICINE

## 2019-09-05 PROCEDURE — 25010000002 ONDANSETRON PER 1 MG: Performed by: PHYSICIAN ASSISTANT

## 2019-09-05 RX ADMIN — HYDROCODONE BITARTRATE AND ACETAMINOPHEN 1 TABLET: 7.5; 325 TABLET ORAL at 13:46

## 2019-09-05 RX ADMIN — MONTELUKAST SODIUM 10 MG: 10 TABLET, COATED ORAL at 21:02

## 2019-09-05 RX ADMIN — DOCUSATE SODIUM 200 MG: 100 CAPSULE, LIQUID FILLED ORAL at 21:02

## 2019-09-05 RX ADMIN — CARBIDOPA AND LEVODOPA 1 TABLET: 25; 100 TABLET ORAL at 21:02

## 2019-09-05 RX ADMIN — PHENOL 1 SPRAY: 1.5 LIQUID ORAL at 11:29

## 2019-09-05 RX ADMIN — OXYBUTYNIN CHLORIDE 10 MG: 5 TABLET, EXTENDED RELEASE ORAL at 08:01

## 2019-09-05 RX ADMIN — PANTOPRAZOLE SODIUM 40 MG: 40 TABLET, DELAYED RELEASE ORAL at 06:04

## 2019-09-05 RX ADMIN — MELOXICAM 15 MG: 7.5 TABLET ORAL at 08:01

## 2019-09-05 RX ADMIN — FUROSEMIDE 20 MG: 20 TABLET ORAL at 08:01

## 2019-09-05 RX ADMIN — FLUTICASONE PROPIONATE 1 SPRAY: 50 SPRAY, METERED NASAL at 08:01

## 2019-09-05 RX ADMIN — SODIUM CHLORIDE, PRESERVATIVE FREE 10 ML: 5 INJECTION INTRAVENOUS at 21:02

## 2019-09-05 RX ADMIN — HYDROMORPHONE HYDROCHLORIDE 0.25 MG: 1 INJECTION, SOLUTION INTRAMUSCULAR; INTRAVENOUS; SUBCUTANEOUS at 03:56

## 2019-09-05 RX ADMIN — POTASSIUM CHLORIDE 10 MEQ: 750 CAPSULE, EXTENDED RELEASE ORAL at 08:01

## 2019-09-05 RX ADMIN — CITALOPRAM HYDROBROMIDE 20 MG: 20 TABLET ORAL at 08:01

## 2019-09-05 RX ADMIN — ONDANSETRON 4 MG: 2 INJECTION INTRAMUSCULAR; INTRAVENOUS at 04:24

## 2019-09-05 RX ADMIN — HYDROCODONE BITARTRATE AND ACETAMINOPHEN 1 TABLET: 7.5; 325 TABLET ORAL at 08:01

## 2019-09-05 RX ADMIN — CETIRIZINE HYDROCHLORIDE 10 MG: 10 TABLET, FILM COATED ORAL at 21:02

## 2019-09-05 RX ADMIN — SODIUM CHLORIDE 30 ML/HR: 9 INJECTION, SOLUTION INTRAVENOUS at 04:19

## 2019-09-06 ENCOUNTER — APPOINTMENT (OUTPATIENT)
Dept: GENERAL RADIOLOGY | Facility: HOSPITAL | Age: 59
End: 2019-09-06

## 2019-09-06 LAB
ABO + RH BLD: NORMAL
ABO + RH BLD: NORMAL
ALBUMIN SERPL-MCNC: 3.2 G/DL (ref 3.5–5.2)
ANION GAP SERPL CALCULATED.3IONS-SCNC: 9 MMOL/L (ref 5–15)
BASOPHILS # BLD AUTO: 0.02 10*3/MM3 (ref 0–0.2)
BASOPHILS NFR BLD AUTO: 0.3 % (ref 0–1.5)
BH BB BLOOD EXPIRATION DATE: NORMAL
BH BB BLOOD EXPIRATION DATE: NORMAL
BH BB BLOOD TYPE BARCODE: 6200
BH BB BLOOD TYPE BARCODE: 6200
BH BB DISPENSE STATUS: NORMAL
BH BB DISPENSE STATUS: NORMAL
BH BB PRODUCT CODE: NORMAL
BH BB PRODUCT CODE: NORMAL
BH BB UNIT NUMBER: NORMAL
BH BB UNIT NUMBER: NORMAL
BUN BLD-MCNC: 10 MG/DL (ref 8–23)
BUN/CREAT SERPL: 14.7 (ref 7–25)
CALCIUM SPEC-SCNC: 8.3 MG/DL (ref 8.6–10.5)
CHLORIDE SERPL-SCNC: 93 MMOL/L (ref 98–107)
CO2 SERPL-SCNC: 28 MMOL/L (ref 22–29)
CREAT BLD-MCNC: 0.68 MG/DL (ref 0.57–1)
DEPRECATED RDW RBC AUTO: 44.4 FL (ref 37–54)
EOSINOPHIL # BLD AUTO: 0.3 10*3/MM3 (ref 0–0.4)
EOSINOPHIL NFR BLD AUTO: 4.2 % (ref 0.3–6.2)
ERYTHROCYTE [DISTWIDTH] IN BLOOD BY AUTOMATED COUNT: 13.2 % (ref 12.3–15.4)
GFR SERPL CREATININE-BSD FRML MDRD: 88 ML/MIN/1.73
GLUCOSE BLD-MCNC: 113 MG/DL (ref 65–99)
HCT VFR BLD AUTO: 33.7 % (ref 34–46.6)
HGB BLD-MCNC: 10.7 G/DL (ref 12–15.9)
IMM GRANULOCYTES # BLD AUTO: 0.02 10*3/MM3 (ref 0–0.05)
IMM GRANULOCYTES NFR BLD AUTO: 0.3 % (ref 0–0.5)
LYMPHOCYTES # BLD AUTO: 1.39 10*3/MM3 (ref 0.7–3.1)
LYMPHOCYTES NFR BLD AUTO: 19.3 % (ref 19.6–45.3)
MAGNESIUM SERPL-MCNC: 1.8 MG/DL (ref 1.6–2.4)
MCH RBC QN AUTO: 28.8 PG (ref 26.6–33)
MCHC RBC AUTO-ENTMCNC: 31.8 G/DL (ref 31.5–35.7)
MCV RBC AUTO: 90.8 FL (ref 79–97)
MONOCYTES # BLD AUTO: 0.58 10*3/MM3 (ref 0.1–0.9)
MONOCYTES NFR BLD AUTO: 8.1 % (ref 5–12)
NEUTROPHILS # BLD AUTO: 4.88 10*3/MM3 (ref 1.7–7)
NEUTROPHILS NFR BLD AUTO: 67.8 % (ref 42.7–76)
NRBC BLD AUTO-RTO: 0 /100 WBC (ref 0–0.2)
PHOSPHATE SERPL-MCNC: 2.5 MG/DL (ref 2.5–4.5)
PLATELET # BLD AUTO: 199 10*3/MM3 (ref 140–450)
PMV BLD AUTO: 9.1 FL (ref 6–12)
POTASSIUM BLD-SCNC: 4.1 MMOL/L (ref 3.5–5.2)
RBC # BLD AUTO: 3.71 10*6/MM3 (ref 3.77–5.28)
SODIUM BLD-SCNC: 130 MMOL/L (ref 136–145)
UNIT  ABO: NORMAL
UNIT  ABO: NORMAL
UNIT  RH: NORMAL
UNIT  RH: NORMAL
WBC NRBC COR # BLD: 7.19 10*3/MM3 (ref 3.4–10.8)

## 2019-09-06 PROCEDURE — 80069 RENAL FUNCTION PANEL: CPT | Performed by: INTERNAL MEDICINE

## 2019-09-06 PROCEDURE — 83735 ASSAY OF MAGNESIUM: CPT | Performed by: INTERNAL MEDICINE

## 2019-09-06 PROCEDURE — 99232 SBSQ HOSP IP/OBS MODERATE 35: CPT | Performed by: INTERNAL MEDICINE

## 2019-09-06 PROCEDURE — 71045 X-RAY EXAM CHEST 1 VIEW: CPT

## 2019-09-06 PROCEDURE — 85025 COMPLETE CBC W/AUTO DIFF WBC: CPT | Performed by: INTERNAL MEDICINE

## 2019-09-06 PROCEDURE — 97110 THERAPEUTIC EXERCISES: CPT

## 2019-09-06 PROCEDURE — 97116 GAIT TRAINING THERAPY: CPT

## 2019-09-06 PROCEDURE — 25010000002 MORPHINE PER 10 MG: Performed by: NURSE ANESTHETIST, CERTIFIED REGISTERED

## 2019-09-06 RX ADMIN — FUROSEMIDE 20 MG: 20 TABLET ORAL at 08:49

## 2019-09-06 RX ADMIN — MELOXICAM 15 MG: 7.5 TABLET ORAL at 08:49

## 2019-09-06 RX ADMIN — SODIUM CHLORIDE, PRESERVATIVE FREE 10 ML: 5 INJECTION INTRAVENOUS at 20:28

## 2019-09-06 RX ADMIN — BUPIVACAINE HYDROCHLORIDE: 5 INJECTION, SOLUTION EPIDURAL; INTRACAUDAL; PERINEURAL at 03:40

## 2019-09-06 RX ADMIN — PANTOPRAZOLE SODIUM 40 MG: 40 TABLET, DELAYED RELEASE ORAL at 06:46

## 2019-09-06 RX ADMIN — OXYBUTYNIN CHLORIDE 10 MG: 5 TABLET, EXTENDED RELEASE ORAL at 08:49

## 2019-09-06 RX ADMIN — DOCUSATE SODIUM 200 MG: 100 CAPSULE, LIQUID FILLED ORAL at 20:27

## 2019-09-06 RX ADMIN — CETIRIZINE HYDROCHLORIDE 10 MG: 10 TABLET, FILM COATED ORAL at 20:27

## 2019-09-06 RX ADMIN — FLUTICASONE PROPIONATE 1 SPRAY: 50 SPRAY, METERED NASAL at 08:50

## 2019-09-06 RX ADMIN — CITALOPRAM HYDROBROMIDE 20 MG: 20 TABLET ORAL at 08:49

## 2019-09-06 RX ADMIN — MONTELUKAST SODIUM 10 MG: 10 TABLET, COATED ORAL at 20:27

## 2019-09-06 RX ADMIN — HYDROCODONE BITARTRATE AND ACETAMINOPHEN 1 TABLET: 7.5; 325 TABLET ORAL at 07:10

## 2019-09-06 RX ADMIN — CARBIDOPA AND LEVODOPA 1 TABLET: 25; 100 TABLET ORAL at 20:27

## 2019-09-06 RX ADMIN — POTASSIUM CHLORIDE 10 MEQ: 750 CAPSULE, EXTENDED RELEASE ORAL at 08:49

## 2019-09-06 RX ADMIN — MAGNESIUM SULFATE HEPTAHYDRATE 4 G: 40 INJECTION, SOLUTION INTRAVENOUS at 08:50

## 2019-09-07 ENCOUNTER — APPOINTMENT (OUTPATIENT)
Dept: GENERAL RADIOLOGY | Facility: HOSPITAL | Age: 59
End: 2019-09-07

## 2019-09-07 LAB
ANION GAP SERPL CALCULATED.3IONS-SCNC: 10 MMOL/L (ref 5–15)
BASOPHILS # BLD AUTO: 0.03 10*3/MM3 (ref 0–0.2)
BASOPHILS NFR BLD AUTO: 0.4 % (ref 0–1.5)
BUN BLD-MCNC: 13 MG/DL (ref 8–23)
BUN/CREAT SERPL: 16.5 (ref 7–25)
CALCIUM SPEC-SCNC: 8.5 MG/DL (ref 8.6–10.5)
CHLORIDE SERPL-SCNC: 93 MMOL/L (ref 98–107)
CO2 SERPL-SCNC: 30 MMOL/L (ref 22–29)
CREAT BLD-MCNC: 0.79 MG/DL (ref 0.57–1)
DEPRECATED RDW RBC AUTO: 45.1 FL (ref 37–54)
EOSINOPHIL # BLD AUTO: 0.39 10*3/MM3 (ref 0–0.4)
EOSINOPHIL NFR BLD AUTO: 5.5 % (ref 0.3–6.2)
ERYTHROCYTE [DISTWIDTH] IN BLOOD BY AUTOMATED COUNT: 13.2 % (ref 12.3–15.4)
GFR SERPL CREATININE-BSD FRML MDRD: 74 ML/MIN/1.73
GLUCOSE BLD-MCNC: 116 MG/DL (ref 65–99)
HCT VFR BLD AUTO: 33.6 % (ref 34–46.6)
HGB BLD-MCNC: 10.9 G/DL (ref 12–15.9)
IMM GRANULOCYTES # BLD AUTO: 0.03 10*3/MM3 (ref 0–0.05)
IMM GRANULOCYTES NFR BLD AUTO: 0.4 % (ref 0–0.5)
LYMPHOCYTES # BLD AUTO: 1.29 10*3/MM3 (ref 0.7–3.1)
LYMPHOCYTES NFR BLD AUTO: 18.2 % (ref 19.6–45.3)
MAGNESIUM SERPL-MCNC: 2 MG/DL (ref 1.6–2.4)
MCH RBC QN AUTO: 29.5 PG (ref 26.6–33)
MCHC RBC AUTO-ENTMCNC: 32.4 G/DL (ref 31.5–35.7)
MCV RBC AUTO: 91.1 FL (ref 79–97)
MONOCYTES # BLD AUTO: 0.59 10*3/MM3 (ref 0.1–0.9)
MONOCYTES NFR BLD AUTO: 8.3 % (ref 5–12)
NEUTROPHILS # BLD AUTO: 4.76 10*3/MM3 (ref 1.7–7)
NEUTROPHILS NFR BLD AUTO: 67.2 % (ref 42.7–76)
NRBC BLD AUTO-RTO: 0 /100 WBC (ref 0–0.2)
PLATELET # BLD AUTO: 221 10*3/MM3 (ref 140–450)
PMV BLD AUTO: 9.1 FL (ref 6–12)
POTASSIUM BLD-SCNC: 4.3 MMOL/L (ref 3.5–5.2)
RBC # BLD AUTO: 3.69 10*6/MM3 (ref 3.77–5.28)
SODIUM BLD-SCNC: 133 MMOL/L (ref 136–145)
WBC NRBC COR # BLD: 7.09 10*3/MM3 (ref 3.4–10.8)

## 2019-09-07 PROCEDURE — 71045 X-RAY EXAM CHEST 1 VIEW: CPT

## 2019-09-07 PROCEDURE — 83735 ASSAY OF MAGNESIUM: CPT | Performed by: THORACIC SURGERY (CARDIOTHORACIC VASCULAR SURGERY)

## 2019-09-07 PROCEDURE — 80048 BASIC METABOLIC PNL TOTAL CA: CPT | Performed by: INTERNAL MEDICINE

## 2019-09-07 PROCEDURE — 25010000002 MORPHINE PER 10 MG: Performed by: NURSE ANESTHETIST, CERTIFIED REGISTERED

## 2019-09-07 PROCEDURE — 25010000002 HYDROMORPHONE PER 4 MG: Performed by: NURSE ANESTHETIST, CERTIFIED REGISTERED

## 2019-09-07 PROCEDURE — 99232 SBSQ HOSP IP/OBS MODERATE 35: CPT | Performed by: INTERNAL MEDICINE

## 2019-09-07 PROCEDURE — 85025 COMPLETE CBC W/AUTO DIFF WBC: CPT | Performed by: INTERNAL MEDICINE

## 2019-09-07 RX ADMIN — OXYCODONE HYDROCHLORIDE AND ACETAMINOPHEN 1 TABLET: 5; 325 TABLET ORAL at 22:04

## 2019-09-07 RX ADMIN — HYDROCODONE BITARTRATE AND ACETAMINOPHEN 1 TABLET: 7.5; 325 TABLET ORAL at 11:09

## 2019-09-07 RX ADMIN — DOCUSATE SODIUM 200 MG: 100 CAPSULE, LIQUID FILLED ORAL at 22:04

## 2019-09-07 RX ADMIN — FLUTICASONE PROPIONATE 1 SPRAY: 50 SPRAY, METERED NASAL at 08:15

## 2019-09-07 RX ADMIN — FUROSEMIDE 20 MG: 20 TABLET ORAL at 08:15

## 2019-09-07 RX ADMIN — OXYCODONE HYDROCHLORIDE AND ACETAMINOPHEN 1 TABLET: 5; 325 TABLET ORAL at 16:06

## 2019-09-07 RX ADMIN — CITALOPRAM HYDROBROMIDE 20 MG: 20 TABLET ORAL at 08:15

## 2019-09-07 RX ADMIN — HYDROMORPHONE HYDROCHLORIDE 0.25 MG: 1 INJECTION, SOLUTION INTRAMUSCULAR; INTRAVENOUS; SUBCUTANEOUS at 11:11

## 2019-09-07 RX ADMIN — CETIRIZINE HYDROCHLORIDE 10 MG: 10 TABLET, FILM COATED ORAL at 22:03

## 2019-09-07 RX ADMIN — PANTOPRAZOLE SODIUM 40 MG: 40 TABLET, DELAYED RELEASE ORAL at 06:34

## 2019-09-07 RX ADMIN — POTASSIUM CHLORIDE 10 MEQ: 750 CAPSULE, EXTENDED RELEASE ORAL at 08:15

## 2019-09-07 RX ADMIN — SODIUM CHLORIDE, PRESERVATIVE FREE 10 ML: 5 INJECTION INTRAVENOUS at 22:04

## 2019-09-07 RX ADMIN — CARBIDOPA AND LEVODOPA 1 TABLET: 25; 100 TABLET ORAL at 22:03

## 2019-09-07 RX ADMIN — SODIUM CHLORIDE, PRESERVATIVE FREE 10 ML: 5 INJECTION INTRAVENOUS at 08:14

## 2019-09-07 RX ADMIN — MONTELUKAST SODIUM 10 MG: 10 TABLET, COATED ORAL at 22:04

## 2019-09-07 RX ADMIN — HYDROCODONE BITARTRATE AND ACETAMINOPHEN 1 TABLET: 7.5; 325 TABLET ORAL at 18:30

## 2019-09-07 RX ADMIN — BUPIVACAINE HYDROCHLORIDE: 5 INJECTION, SOLUTION EPIDURAL; INTRACAUDAL; PERINEURAL at 10:20

## 2019-09-07 RX ADMIN — OXYBUTYNIN CHLORIDE 10 MG: 5 TABLET, EXTENDED RELEASE ORAL at 08:14

## 2019-09-07 RX ADMIN — MELOXICAM 15 MG: 7.5 TABLET ORAL at 08:14

## 2019-09-08 ENCOUNTER — APPOINTMENT (OUTPATIENT)
Dept: GENERAL RADIOLOGY | Facility: HOSPITAL | Age: 59
End: 2019-09-08

## 2019-09-08 LAB
ANION GAP SERPL CALCULATED.3IONS-SCNC: 7 MMOL/L (ref 5–15)
BUN BLD-MCNC: 13 MG/DL (ref 8–23)
BUN/CREAT SERPL: 16.9 (ref 7–25)
CALCIUM SPEC-SCNC: 8.5 MG/DL (ref 8.6–10.5)
CHLORIDE SERPL-SCNC: 93 MMOL/L (ref 98–107)
CO2 SERPL-SCNC: 30 MMOL/L (ref 22–29)
CREAT BLD-MCNC: 0.77 MG/DL (ref 0.57–1)
DEPRECATED RDW RBC AUTO: 44.3 FL (ref 37–54)
ERYTHROCYTE [DISTWIDTH] IN BLOOD BY AUTOMATED COUNT: 13.2 % (ref 12.3–15.4)
GFR SERPL CREATININE-BSD FRML MDRD: 76 ML/MIN/1.73
GLUCOSE BLD-MCNC: 98 MG/DL (ref 65–99)
HCT VFR BLD AUTO: 32.5 % (ref 34–46.6)
HGB BLD-MCNC: 10.5 G/DL (ref 12–15.9)
MCH RBC QN AUTO: 29.3 PG (ref 26.6–33)
MCHC RBC AUTO-ENTMCNC: 32.3 G/DL (ref 31.5–35.7)
MCV RBC AUTO: 90.8 FL (ref 79–97)
PLATELET # BLD AUTO: 234 10*3/MM3 (ref 140–450)
PMV BLD AUTO: 9.4 FL (ref 6–12)
POTASSIUM BLD-SCNC: 4 MMOL/L (ref 3.5–5.2)
RBC # BLD AUTO: 3.58 10*6/MM3 (ref 3.77–5.28)
SODIUM BLD-SCNC: 130 MMOL/L (ref 136–145)
WBC NRBC COR # BLD: 7.02 10*3/MM3 (ref 3.4–10.8)

## 2019-09-08 PROCEDURE — 85027 COMPLETE CBC AUTOMATED: CPT | Performed by: INTERNAL MEDICINE

## 2019-09-08 PROCEDURE — 71045 X-RAY EXAM CHEST 1 VIEW: CPT

## 2019-09-08 PROCEDURE — 99232 SBSQ HOSP IP/OBS MODERATE 35: CPT | Performed by: INTERNAL MEDICINE

## 2019-09-08 PROCEDURE — 97530 THERAPEUTIC ACTIVITIES: CPT

## 2019-09-08 PROCEDURE — 80048 BASIC METABOLIC PNL TOTAL CA: CPT | Performed by: INTERNAL MEDICINE

## 2019-09-08 PROCEDURE — 97116 GAIT TRAINING THERAPY: CPT

## 2019-09-08 RX ORDER — SENNA AND DOCUSATE SODIUM 50; 8.6 MG/1; MG/1
2 TABLET, FILM COATED ORAL 2 TIMES DAILY
Status: DISCONTINUED | OUTPATIENT
Start: 2019-09-08 | End: 2019-09-09 | Stop reason: HOSPADM

## 2019-09-08 RX ORDER — BISACODYL 10 MG
10 SUPPOSITORY, RECTAL RECTAL DAILY PRN
Status: DISCONTINUED | OUTPATIENT
Start: 2019-09-08 | End: 2019-09-09 | Stop reason: HOSPADM

## 2019-09-08 RX ORDER — DOCUSATE SODIUM 100 MG/1
100 CAPSULE, LIQUID FILLED ORAL DAILY
Status: DISCONTINUED | OUTPATIENT
Start: 2019-09-08 | End: 2019-09-09 | Stop reason: HOSPADM

## 2019-09-08 RX ORDER — BISACODYL 5 MG/1
5 TABLET, DELAYED RELEASE ORAL DAILY PRN
Status: DISCONTINUED | OUTPATIENT
Start: 2019-09-08 | End: 2019-09-09 | Stop reason: HOSPADM

## 2019-09-08 RX ADMIN — FUROSEMIDE 20 MG: 20 TABLET ORAL at 08:07

## 2019-09-08 RX ADMIN — CARBIDOPA AND LEVODOPA 1 TABLET: 25; 100 TABLET ORAL at 21:26

## 2019-09-08 RX ADMIN — SODIUM CHLORIDE, PRESERVATIVE FREE 10 ML: 5 INJECTION INTRAVENOUS at 21:26

## 2019-09-08 RX ADMIN — SODIUM CHLORIDE, PRESERVATIVE FREE 10 ML: 5 INJECTION INTRAVENOUS at 08:09

## 2019-09-08 RX ADMIN — FLUTICASONE PROPIONATE 1 SPRAY: 50 SPRAY, METERED NASAL at 08:08

## 2019-09-08 RX ADMIN — POTASSIUM CHLORIDE 10 MEQ: 750 CAPSULE, EXTENDED RELEASE ORAL at 08:08

## 2019-09-08 RX ADMIN — MELOXICAM 15 MG: 7.5 TABLET ORAL at 08:08

## 2019-09-08 RX ADMIN — OXYBUTYNIN CHLORIDE 10 MG: 5 TABLET, EXTENDED RELEASE ORAL at 08:08

## 2019-09-08 RX ADMIN — DOCUSATE SODIUM 100 MG: 100 CAPSULE, LIQUID FILLED ORAL at 08:08

## 2019-09-08 RX ADMIN — DOCUSATE SODIUM 200 MG: 100 CAPSULE, LIQUID FILLED ORAL at 21:26

## 2019-09-08 RX ADMIN — Medication 10 MG: at 16:31

## 2019-09-08 RX ADMIN — CITALOPRAM HYDROBROMIDE 20 MG: 20 TABLET ORAL at 08:07

## 2019-09-08 RX ADMIN — BISACODYL 5 MG: 5 TABLET, COATED ORAL at 08:08

## 2019-09-08 RX ADMIN — SENNOSIDES, DOCUSATE SODIUM 2 TABLET: 50; 8.6 TABLET, FILM COATED ORAL at 08:08

## 2019-09-08 RX ADMIN — PANTOPRAZOLE SODIUM 40 MG: 40 TABLET, DELAYED RELEASE ORAL at 06:37

## 2019-09-08 RX ADMIN — SENNOSIDES, DOCUSATE SODIUM 2 TABLET: 50; 8.6 TABLET, FILM COATED ORAL at 21:26

## 2019-09-08 RX ADMIN — CETIRIZINE HYDROCHLORIDE 10 MG: 10 TABLET, FILM COATED ORAL at 21:26

## 2019-09-08 RX ADMIN — MONTELUKAST SODIUM 10 MG: 10 TABLET, COATED ORAL at 21:26

## 2019-09-09 ENCOUNTER — APPOINTMENT (OUTPATIENT)
Dept: GENERAL RADIOLOGY | Facility: HOSPITAL | Age: 59
End: 2019-09-09

## 2019-09-09 VITALS
HEART RATE: 87 BPM | WEIGHT: 180.78 LBS | DIASTOLIC BLOOD PRESSURE: 90 MMHG | BODY MASS INDEX: 38.44 KG/M2 | OXYGEN SATURATION: 93 % | TEMPERATURE: 98.6 F | SYSTOLIC BLOOD PRESSURE: 122 MMHG | RESPIRATION RATE: 20 BRPM

## 2019-09-09 LAB
ANION GAP SERPL CALCULATED.3IONS-SCNC: 11 MMOL/L (ref 5–15)
BUN BLD-MCNC: 12 MG/DL (ref 8–23)
BUN/CREAT SERPL: 16.4 (ref 7–25)
CALCIUM SPEC-SCNC: 9 MG/DL (ref 8.6–10.5)
CHLORIDE SERPL-SCNC: 92 MMOL/L (ref 98–107)
CO2 SERPL-SCNC: 31 MMOL/L (ref 22–29)
CREAT BLD-MCNC: 0.73 MG/DL (ref 0.57–1)
GFR SERPL CREATININE-BSD FRML MDRD: 81 ML/MIN/1.73
GLUCOSE BLD-MCNC: 118 MG/DL (ref 65–99)
POTASSIUM BLD-SCNC: 4.4 MMOL/L (ref 3.5–5.2)
SODIUM BLD-SCNC: 134 MMOL/L (ref 136–145)

## 2019-09-09 PROCEDURE — 71045 X-RAY EXAM CHEST 1 VIEW: CPT

## 2019-09-09 PROCEDURE — 99024 POSTOP FOLLOW-UP VISIT: CPT | Performed by: PHYSICIAN ASSISTANT

## 2019-09-09 PROCEDURE — 80048 BASIC METABOLIC PNL TOTAL CA: CPT | Performed by: INTERNAL MEDICINE

## 2019-09-09 RX ADMIN — PANTOPRAZOLE SODIUM 40 MG: 40 TABLET, DELAYED RELEASE ORAL at 05:42

## 2019-09-09 RX ADMIN — CYCLOBENZAPRINE HYDROCHLORIDE 10 MG: 10 TABLET, FILM COATED ORAL at 08:13

## 2019-09-09 RX ADMIN — FUROSEMIDE 20 MG: 20 TABLET ORAL at 08:13

## 2019-09-09 RX ADMIN — SODIUM CHLORIDE, PRESERVATIVE FREE 10 ML: 5 INJECTION INTRAVENOUS at 08:17

## 2019-09-09 RX ADMIN — CITALOPRAM HYDROBROMIDE 20 MG: 20 TABLET ORAL at 08:13

## 2019-09-09 RX ADMIN — POTASSIUM CHLORIDE 10 MEQ: 750 CAPSULE, EXTENDED RELEASE ORAL at 08:13

## 2019-09-09 RX ADMIN — HYDROCODONE BITARTRATE AND ACETAMINOPHEN 1 TABLET: 7.5; 325 TABLET ORAL at 11:31

## 2019-09-09 RX ADMIN — FLUTICASONE PROPIONATE 1 SPRAY: 50 SPRAY, METERED NASAL at 08:14

## 2019-09-09 RX ADMIN — MELOXICAM 15 MG: 7.5 TABLET ORAL at 08:14

## 2019-09-09 RX ADMIN — OXYBUTYNIN CHLORIDE 10 MG: 5 TABLET, EXTENDED RELEASE ORAL at 08:13

## 2019-09-10 ENCOUNTER — READMISSION MANAGEMENT (OUTPATIENT)
Dept: CALL CENTER | Facility: HOSPITAL | Age: 59
End: 2019-09-10

## 2019-09-11 ENCOUNTER — READMISSION MANAGEMENT (OUTPATIENT)
Dept: CALL CENTER | Facility: HOSPITAL | Age: 59
End: 2019-09-11

## 2019-09-11 NOTE — OUTREACH NOTE
CT Surgery Week 1 Survey      Responses   Facility patient discharged from?  [facility]   Does the patient have one of the following disease processes/diagnoses(primary or secondary)?  Cardiothoracic surgery   Is there a successful TCM telephone encounter documented?  No   Week 1 attempt successful?  Yes   Call start time  1345   Call end time  1352   Discharge diagnosis  LUNG, RIGHT LOWER LOBE, WEDGE EXCISION,    LUNG, RIGHT LOWER LOBE, LOBECTOMY   Meds reviewed with patient/caregiver?  Yes   Is the patient having any side effects they believe may be caused by any medication additions or changes?  No   Does the patient have all medications related to this admission filled (includes all antibiotics, pain medications, cardiac medications, etc.)  Yes   Is the patient taking all medications as directed (includes completed medication regime)?  Yes   Does the patient have a primary care provider?   Yes   Does the patient have an appointment scheduled with their C/T surgeon?  No   What is preventing the patient from scheduling follow up appointments?  Waiting on return call   Nursing Interventions  Verified appointment date/time/provider, Educated patient on importance of making appointment, Advised patient to make appointment   Has the patient kept scheduled appointments due by today?  N/A   Comments  Pt advised to call her PCP and to f/u with surgeon next week if she hasn't heard from his office   Has home health visited the patient within 72 hours of discharge?  N/A   What DME was ordered?  [facility] - O2--no issues   Has all DME been delivered?  Yes   Psychosocial issues?  No   Did the patient receive a copy of their discharge instructions?  Yes   Nursing interventions  Reviewed instructions with patient   Nursing interventions  Nurse provided patient education   Is the patient/caregiver able to teach back normal signs of recovery?  Pain or discomfort at incisional site   Nursing interventions  Reassured on normal  signs of recovery   Is the patient /caregiver able to teach back basic post-op care?  Practice 'cough and deep breath', Drive as instructed by MD in discharge instructions, Keep incision areas clean, dry and protected, Lifting as instructed by MD in discharge instructions   Is the patient/caregiver able to teach back signs and symptoms of incisional infection?  Increased redness, swelling or pain at the incisonal site, Increased drainage or bleeding, Incisional warmth, Pus or odor from incision, Fever   Is the patient/caregiver able to teach back steps to recovery at home?  Set small, achievable goals for return to baseline health, Rest and rebuild strength, gradually increase activity, Weigh daily   If the patient is a current smoker, are they able to teach back resources for cessation?  Smoking cessation medications   Is the patient/caregiver able to teach back the hierarchy of who to call/visit for symptoms/problems? PCP, Specialist, Home health nurse, Urgent Care, ED, 911  Yes   Additional teach back comments  Pt states she is not smoking--cigarettes or e-cigarettes. She has her incision open to air and we reviewed s/s of infection. Enc her to return to MD for f/u visits.    Week 1 call completed?  Yes            Alisa Kern, RN

## 2019-09-11 NOTE — OUTREACH NOTE
Prep Survey      Responses   Facility patient discharged from?  [facility]   Is patient eligible?  Yes   Discharge diagnosis  LUNG, RIGHT LOWER LOBE, WEDGE EXCISION,    LUNG, RIGHT LOWER LOBE, LOBECTOMY   Does the patient have one of the following disease processes/diagnoses(primary or secondary)?  Cardiothoracic surgery   Does the patient have Home health ordered?  No   Is there a DME ordered?  Yes   What DME was ordered?  [facility] - O2   Prep survey completed?  Yes          Enedina Arechiga RN

## 2019-09-13 ENCOUNTER — MDT ASSESSMENT (OUTPATIENT)
Dept: ONCOLOGY | Facility: CLINIC | Age: 59
End: 2019-09-13

## 2019-09-18 ENCOUNTER — READMISSION MANAGEMENT (OUTPATIENT)
Dept: CALL CENTER | Facility: HOSPITAL | Age: 59
End: 2019-09-18

## 2019-09-18 NOTE — OUTREACH NOTE
CT Surgery Week 2 Survey      Responses   Facility patient discharged from?  [facility]   Does the patient have one of the following disease processes/diagnoses(primary or secondary)?  Cardiothoracic surgery   Week 2 attempt successful?  No   Unsuccessful attempts  Attempt 1          Ramiro Dow RN

## 2019-09-20 ENCOUNTER — READMISSION MANAGEMENT (OUTPATIENT)
Dept: CALL CENTER | Facility: HOSPITAL | Age: 59
End: 2019-09-20

## 2019-09-20 NOTE — OUTREACH NOTE
CT Surgery Week 2 Survey      Responses   Facility patient discharged from?  [facility]   Does the patient have one of the following disease processes/diagnoses(primary or secondary)?  Cardiothoracic surgery   Week 2 attempt successful?  Yes   Call start time  1130   Call end time  1136   Discharge diagnosis  LUNG, RIGHT LOWER LOBE, WEDGE EXCISION,    LUNG, RIGHT LOWER LOBE, LOBECTOMY   Is patient permission given to speak with other caregiver?  Yes   List who call center can speak with     Meds reviewed with patient/caregiver?  Yes   Is the patient having any side effects they believe may be caused by any medication additions or changes?  No   Is the patient taking all medications as directed (includes completed medication regime)?  Yes   Does the patient have a primary care provider?   Yes   Does the patient have an appointment scheduled with their C/T surgeon?  Yes   Has the patient kept scheduled appointments due by today?  N/A   What is the Home health agency?   [facility]    Has home health visited the patient within 72 hours of discharge?  Yes   Home health comments  HH came today and gave good report per pt.    DME comments  Wearing home O2@2L.    Psychosocial issues?  No   Comments  Denies SOA and ALEXSANDER as long as she stays on O2 but when she tries to wean herself off it she develops SOA. Incision is without issues. Denies pain. Appetite is picking up. Denies issues urinating, using stool softeners for constipation..    What is the patient's perception of their health status since discharge?  Improving   Is the patient/caregiver able to teach back normal signs of recovery?  Constipation   Nursing interventions  Reassured on normal signs of recovery   Is the patient /caregiver able to teach back basic post-op care?  Continue use of incentive spirometry at least 1 week post discharge, Drive as instructed by MD in discharge instructions, No tub bath, swimming, or hot tub until instructed by MD, Do not remove  "steri-strips   Is the patient/caregiver able to teach back signs and symptoms of incisional infection?  Pus or odor from incision, Incisional warmth   Is the patient/caregiver able to teach back steps to recovery at home?  Weigh daily, Rest and rebuild strength, gradually increase activity   If the patient is a current smoker, are they able to teach back resources for cessation?  -- [Pt has stopped smoking/vaping \"cold turkey\" per pt. She has refrained from smoking. ]   Is the patient/caregiver able to teach back the hierarchy of who to call/visit for symptoms/problems? PCP, Specialist, Home health nurse, Urgent Care, ED, 911  Yes   Week 2 call completed?  Yes          Carol Ann Weeks RN  "

## 2019-09-30 ENCOUNTER — OFFICE VISIT (OUTPATIENT)
Dept: CARDIAC SURGERY | Facility: CLINIC | Age: 59
End: 2019-09-30

## 2019-09-30 ENCOUNTER — READMISSION MANAGEMENT (OUTPATIENT)
Dept: CALL CENTER | Facility: HOSPITAL | Age: 59
End: 2019-09-30

## 2019-09-30 VITALS
OXYGEN SATURATION: 97 % | HEIGHT: 57 IN | WEIGHT: 178 LBS | SYSTOLIC BLOOD PRESSURE: 155 MMHG | TEMPERATURE: 98.2 F | HEART RATE: 70 BPM | DIASTOLIC BLOOD PRESSURE: 77 MMHG | BODY MASS INDEX: 38.4 KG/M2

## 2019-09-30 DIAGNOSIS — Z90.2 S/P LOBECTOMY OF LUNG: ICD-10-CM

## 2019-09-30 PROCEDURE — 99024 POSTOP FOLLOW-UP VISIT: CPT | Performed by: PHYSICIAN ASSISTANT

## 2019-09-30 PROCEDURE — 71046 X-RAY EXAM CHEST 2 VIEWS: CPT | Performed by: THORACIC SURGERY (CARDIOTHORACIC VASCULAR SURGERY)

## 2019-09-30 NOTE — PROGRESS NOTES
2019  Patient Information  Fabiana Brody                                                                                          [address] [place] [id number]   1960  'PCP/Referring Physician'  Andrea Lowery MD  [phone]  No ref. provider found    Chief Complaint   Patient presents with   • Post-op     Hospital follow up s/p right lower lobectomy 9/3/19 for lung cancer.   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  The patient is doing very well postoperatively.  She denies any incisional pain, chest pain or shortness of breath.  She is currently followed closely by her pulmonologist Dr. Colunga, as well as her oncologist, Dr. Zelaya at [facility].  Otherwise patient is doing well.    Patient Active Problem List   Diagnosis   • Squamous cell carcinoma of lung, stage I, right (CMS/HCC)   • RYLEY    • COPD    • Hypertension   • Wheelchair dependence   • Tobacco use   • Electronic cigarette use   • Class 2 obesity in adult     Past Medical History:   Diagnosis Date   • Anxiety    • Arthritis    • Cancer (CMS/HCC)     nose-basal cell (skin)   • Constipation    • COPD (chronic obstructive pulmonary disease) (CMS/HCC)     rescue inhaler prn    • Cough    • RNA1R69 intermediate metabolizer (CMS/HCC)    • CYP2C9 normal metabolizer    • CYP2D6 normal metabolizer    • 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   • Dependence on wheelchair    • Depression    • Diabetes mellitus (CMS/HCC)     type 2; no meds currently needed    • GERD (gastroesophageal reflux disease)    • History of fracture     multiple    • History of low potassium     required IV infusion x1 and takes daily po meds    • History of tobacco use  "2019    quit cigarettes and switche to vaping but ready to quit    • Hypertension    • Incontinent of urine 02/21/2019    bladder stimulator in place    • Lung cancer (CMS/HCC)     RLL-monitoring since feb 2019   • Lung mass 02/2019    RLL-monitored since feb 2019   • Peptic ulceration    • Pneumonia    • Sensitivity to warfarin     VKORC1 \"high sensitivity to wafrarin c 88228K>A AA\"    • Sleep apnea    • Slow to wake up after anesthesia     on occasion if receives too much medication      Past Surgical History:   Procedure Laterality Date   • ANKLE CLOSED REDUCTION Left     screws inserted   • BRONCHOSCOPY THORACOTOMY Right 9/3/2019    Procedure: BRONCHOSCOPY RIGHT THORACOTOMY RIGHT LOWER LOBE WEDGE, COMPLETION RIGHT LOWER LOBE ECTOMY;  Surgeon: Phuc Cox MD;  Location: [facility];  Service: Cardiothoracic   • CATARACT EXTRACTION, BILATERAL  2014   • CHOLECYSTECTOMY     • COLONOSCOPY     • ELBOW PROCEDURE Right     plates and pin    • EYE SURGERY     • FETAL SURGERY FOR CONGENITAL HERNIA      unbilical   • HERNIA REPAIR  2016    umbilical    • JOINT REPLACEMENT Left     complete   • OTHER SURGICAL HISTORY      begign basal cell carcinoma removed from nose   • OTHER SURGICAL HISTORY      added plate & pins to right elbow to keep in place   • OTHER SURGICAL HISTORY      had bladder stim battery replaced   • POLYPECTOMY      removed from vocal cords & membranes stripped from them   • SHOULDER SURGERY Right     scraped arthritis and calcium from right shoulder and repaired labrum    • SKIN BIOPSY  2012   • TOTAL SHOULDER REPLACEMENT Left 2011   • TOTAL SHOULDER REVISION  12/31/2013    attempted    • TOTAL SHOULDER REVISION Left 2014    x2   • VOCAL CORD STRIPPING  11/22/1998    polyps removed and membranes stripped        Current Outpatient Medications:   •  albuterol (PROVENTIL) (2.5 MG/3ML) 0.083% nebulizer solution, Take 2.5 mg by nebulization Every 4 (Four) Hours As Needed for Wheezing., Disp: , Rfl:   •  " albuterol sulfate  (90 Base) MCG/ACT inhaler, Inhale 2 puffs Every 4 (Four) Hours As Needed for Wheezing., Disp: , Rfl:   •  carbidopa-levodopa (SINEMET)  MG per tablet, Take 1 tablet by mouth Every Night., Disp: , Rfl:   •  cetirizine (zyrTEC) 10 MG tablet, Take 10 mg by mouth Every Night., Disp: , Rfl:   •  Cholecalciferol (VITAMIN D3) 2000 units tablet, Take 2,000 Units by mouth Daily., Disp: , Rfl:   •  citalopram (CeleXA) 20 MG tablet, Take 20 mg by mouth Every Morning., Disp: , Rfl:   •  Cranberry 1000 MG capsule, Take 2,000 mg by mouth 2 (Two) Times a Day., Disp: , Rfl:   •  cyclobenzaprine (FLEXERIL) 10 MG tablet, Take 10 mg by mouth 3 (Three) Times a Day As Needed for Muscle Spasms., Disp: , Rfl:   •  docusate sodium (COLACE) 250 MG capsule, Take 250 mg by mouth Every Night., Disp: , Rfl:   •  fluticasone (FLONASE) 50 MCG/ACT nasal spray, 1 spray into the nostril(s) as directed by provider Every Morning., Disp: , Rfl:   •  furosemide (LASIX) 20 MG tablet, Take 20 mg by mouth Every Morning., Disp: , Rfl:   •  HYDROcodone-acetaminophen (NORCO)  MG per tablet, TAKE 1 TABLET BY MOUTH EVERY 8 HOURS AS NEEDED FOR 30 DAYS, Disp: , Rfl: 0  •  Krill Oil (OMEGA-3) 500 MG capsule, Take 520 mg by mouth Every Morning., Disp: , Rfl:   •  meloxicam (MOBIC) 15 MG tablet, Take 15 mg by mouth Daily., Disp: , Rfl:   •  Mirabegron ER (MYRBETRIQ) 50 MG tablet sustained-release 24 hour 24 hr tablet, Take 50 mg by mouth Every Night., Disp: , Rfl:   •  montelukast (SINGULAIR) 10 MG tablet, Take 10 mg by mouth Every Night., Disp: , Rfl:   •  pantoprazole (PROTONIX) 20 MG EC tablet, Take 20 mg by mouth Every Morning., Disp: , Rfl:   •  potassium chloride (MICRO-K) 10 MEQ CR capsule, Take 10 mEq by mouth 2 (Two) Times a Day., Disp: , Rfl:   •  pramipexole (MIRAPEX) 0.25 MG tablet, Take 0.5 mg by mouth At Night As Needed., Disp: , Rfl:   •  raNITIdine (ZANTAC) 150 MG tablet, Take 150 mg by mouth As Needed., Disp: ,  "Rfl:   •  Red Yeast Rice Extract (RED YEAST RICE PO), Take 600 mg by mouth 2 (Two) Times a Day., Disp: , Rfl:   Allergies   Allergen Reactions   • Erythromycin Hives and Nausea Only   • Penicillins Hives and Nausea Only   • Pennsaid [Diclofenac Sodium] Other (See Comments)     Skin peels, burns & rolls off.   • Tape Other (See Comments)     Tears skin and creates wounds. (USE PAPER TAPE)   • Tetracyclines & Related Hives and Nausea Only     Social History     Socioeconomic History   • Marital status:      Spouse name: Not on file   • Number of children: 0   • Years of education: Not on file   • Highest education level: Not on file   Occupational History     Employer: DISABLED   Tobacco Use   • Smoking status: Former Smoker     Packs/day: 1.50     Types: Cigarettes, Electronic Cigarette     Last attempt to quit: 2019     Years since quittin.2   • Smokeless tobacco: Never Used   • Tobacco comment: 40 + years; currently vaping    Substance and Sexual Activity   • Alcohol use: No     Frequency: Never   • Drug use: No   • Sexual activity: Defer   Social History Narrative    Lives in [place], [place]     Family History   Problem Relation Age of Onset   • Diabetes Mother    • Heart failure Mother    • Cancer Mother    • Hypertension Mother    • Depression Mother    • Urinary tract infection Mother    • Stroke Mother    • Stroke Father    • Cancer Father    • Hypertension Father    • Kidney disease Father      ROS  Vitals:    19 1408   BP: 155/77   BP Location: Right arm   Patient Position: Sitting   Pulse: 70   Temp: 98.2 °F (36.8 °C)   SpO2: 97%   Weight: 80.7 kg (178 lb)   Height: 144.8 cm (57\")      Physical Exam  Gen - NAD, pleasant, cooperative  CV -regular rate and rhythm, no murmur gallop or rub  Pulm -lungs clear to auscultation bilateral without wheeze or rhonchi  GI -soft, normoactive bowel sounds, nontender  Ext - without edema.   Incision -healing well, no evidence of incisional dehiscence " or cellulitis  Neuro - CN II - XII grossly intact, tongue midline, voice normal    Labs/Imaging:  CXR 9/30/2019  Impression - Ill-defined opacification is seen within the right lower lung. There is subcutaneous emphysema seen along the right chest wall. Mild increased markings at the left lung base.  No definite sign of pneumothorax or pleural effusion.    Assessment/Plan:  Patient is a 59-year-old  female with history of hypertension, COPD, RYLEY status post right thoracotomy with wedge resection and complete right lower lobectomy for stage I A2 squamous cell carcinoma.  The patient's incision is healing well with no signs of dehiscence or cellulitic changes.  Her chest x-ray obtained in office today is satisfactory.  She is currently still using 2 L of oxygen via nasal cannula at home which she will begin weaning herself off of.  She is scheduled to follow-up with her pulmonologist, Dr. Colunga in 6 months with a repeat CT scan of the chest.  She also has an appointment tomorrow with her oncologist, Dr. Zelaya at [facility].  At this point we will see the patient again if needed but she should feel free to call our office at any time with any questions or concerns should any arise.    Patient Active Problem List   Diagnosis   • Squamous cell carcinoma of lung, stage I, right (CMS/HCC)   • RYLEY    • COPD    • Hypertension   • Wheelchair dependence   • Tobacco use   • Electronic cigarette use   • Class 2 obesity in adult

## 2019-09-30 NOTE — OUTREACH NOTE
CT Surgery Week 3 Survey      Responses   Facility patient discharged from?  [facility]   Does the patient have one of the following disease processes/diagnoses(primary or secondary)?  Cardiothoracic surgery   Week 3 attempt successful?  Yes   Call start time  1041   Call end time  1100   Discharge diagnosis  LUNG, RIGHT LOWER LOBE, WEDGE EXCISION,    LUNG, RIGHT LOWER LOBE, LOBECTOMY   Meds reviewed with patient/caregiver?  Yes   Is the patient having any side effects they believe may be caused by any medication additions or changes?  No   Does the patient have all medications related to this admission filled (includes all antibiotics, pain medications, cardiac medications, etc.)  Yes   Is the patient taking all medications as directed (includes completed medication regime)?  Yes   Does the patient have a primary care provider?   Yes   Does the patient have an appointment scheduled with their C/T surgeon?  Yes   Has the patient kept scheduled appointments due by today?  Yes   What is the Home health agency?   [facility]    Has home health visited the patient within 72 hours of discharge?  No   What DME was ordered?  [employer] - O2--no issues   Has all DME been delivered?  Yes   Psychosocial issues?  No   Comments  Patient report she is doing well. Has a followup today with surgeon. Incision healing with s/s of drainage. Mild pain but doing much better.     Did the patient receive a copy of their discharge instructions?  Yes   Nursing interventions  Reviewed instructions with patient   What is the patient's perception of their health status since discharge?  Improving   Nursing interventions  Nurse provided patient education   Nursing interventions  Reassured on normal signs of recovery   Is the patient /caregiver able to teach back basic post-op care?  Continue use of incentive spirometry at least 1 week post discharge, Drive as instructed by MD in discharge instructions, No tub bath, swimming, or hot tub until instructed  by MD, Do not remove steri-strips   Is the patient/caregiver able to teach back signs and symptoms of incisional infection?  Pus or odor from incision, Incisional warmth   Is the patient/caregiver able to teach back steps to recovery at home?  Weigh daily, Rest and rebuild strength, gradually increase activity   Is the patient /caregiver able to teach back the importance of cardiac rehab?  Yes   Is the patient/caregiver able to teach back the hierarchy of who to call/visit for symptoms/problems? PCP, Specialist, Home health nurse, Urgent Care, ED, 911  Yes   Week 3 call completed?  Yes          Ramiro Dow RN

## 2019-10-08 ENCOUNTER — READMISSION MANAGEMENT (OUTPATIENT)
Dept: CALL CENTER | Facility: HOSPITAL | Age: 59
End: 2019-10-08

## 2019-10-08 NOTE — OUTREACH NOTE
CT Surgery Week 4 Survey      Responses   Facility patient discharged from?  [place]   Does the patient have one of the following disease processes/diagnoses(primary or secondary)?  Cardiothoracic surgery   Week 4 attempt successful?  Yes   Call start time  1703   Call end time  1710   Discharge diagnosis  LUNG, RIGHT LOWER LOBE, WEDGE EXCISION,    LUNG, RIGHT LOWER LOBE, LOBECTOMY   Meds reviewed with patient/caregiver?  Yes   Is the patient having any side effects they believe may be caused by any medication additions or changes?  No   Is the patient taking all medications as directed (includes completed medication regime)?  Yes   Has the patient kept scheduled appointments due by today?  Yes   Is the patient still receiving Home Health Services?  No   Psychosocial issues?  No   What is the patient's perception of their health status since discharge?  Improving   Nursing interventions  Nurse provided patient education   Is the patient/caregiver able to teach back normal signs of recovery?  Pain or discomfort at incisional site   Nursing interventions  Reassured on normal signs of recovery   Is the patient /caregiver able to teach back the importance of cardiac rehab?  Yes   Is the patient/caregiver able to teach back the hierarchy of who to call/visit for symptoms/problems? PCP, Specialist, Home health nurse, Urgent Care, ED, 911  Yes   Week 4 Call Completed?  Yes   Would the patient like one additional call?  No   Graduated  Yes   Did the patient feel the follow up calls were helpful during their recovery period?  Yes   Was the number of calls appropriate?  Yes          Pia Rosen RN

## 2019-10-22 ENCOUNTER — OUTSIDE FACILITY SERVICE (OUTPATIENT)
Dept: CARDIAC SURGERY | Facility: CLINIC | Age: 59
End: 2019-10-22

## 2019-10-22 PROCEDURE — G0180 MD CERTIFICATION HHA PATIENT: HCPCS | Performed by: THORACIC SURGERY (CARDIOTHORACIC VASCULAR SURGERY)

## 2021-03-29 ENCOUNTER — HOSPITAL ENCOUNTER (OUTPATIENT)
Dept: RADIATION ONCOLOGY | Facility: HOSPITAL | Age: 61
Setting detail: RADIATION/ONCOLOGY SERIES
Discharge: HOME | End: 2021-03-29

## 2021-03-29 ENCOUNTER — OFFICE VISIT (OUTPATIENT)
Dept: RADIATION ONCOLOGY | Facility: HOSPITAL | Age: 61
End: 2021-03-29

## 2021-03-29 VITALS
HEART RATE: 67 BPM | HEIGHT: 57 IN | OXYGEN SATURATION: 98 % | RESPIRATION RATE: 16 BRPM | WEIGHT: 207.5 LBS | BODY MASS INDEX: 44.77 KG/M2 | DIASTOLIC BLOOD PRESSURE: 61 MMHG | SYSTOLIC BLOOD PRESSURE: 131 MMHG | TEMPERATURE: 97.7 F

## 2021-03-29 DIAGNOSIS — C34.81 MALIGNANT NEOPLASM OF OVERLAPPING SITES OF RIGHT LUNG (HCC): Primary | ICD-10-CM

## 2021-03-29 DIAGNOSIS — Z00.6 EXAMINATION FOR NORMAL COMPARISON FOR CLINICAL RESEARCH: Primary | ICD-10-CM

## 2021-03-29 PROCEDURE — G0463 HOSPITAL OUTPT CLINIC VISIT: HCPCS

## 2021-03-29 RX ORDER — LEVOFLOXACIN 750 MG/1
TABLET ORAL
COMMUNITY
Start: 2021-03-24 | End: 2021-07-16

## 2021-03-29 RX ORDER — SODIUM CHLORIDE FOR INHALATION 3 %
VIAL, NEBULIZER (ML) INHALATION
COMMUNITY
Start: 2021-02-19 | End: 2021-07-16

## 2021-03-29 RX ORDER — PREDNISONE 20 MG/1
TABLET ORAL
COMMUNITY
Start: 2021-03-23 | End: 2021-07-16

## 2021-03-29 RX ORDER — ALPRAZOLAM 0.25 MG/1
0.25 TABLET ORAL DAILY PRN
COMMUNITY
Start: 2021-01-12

## 2021-03-29 RX ORDER — HYDROXYZINE HYDROCHLORIDE 10 MG/1
TABLET, FILM COATED ORAL
COMMUNITY
Start: 2021-03-02 | End: 2021-08-13

## 2021-03-29 RX ORDER — ESCITALOPRAM OXALATE 20 MG/1
TABLET ORAL
COMMUNITY
Start: 2021-01-27

## 2021-03-29 RX ORDER — TIOTROPIUM BROMIDE INHALATION SPRAY 3.12 UG/1
SPRAY, METERED RESPIRATORY (INHALATION)
COMMUNITY
Start: 2021-03-23 | End: 2021-07-16

## 2021-03-29 RX ORDER — BENZONATATE 100 MG/1
CAPSULE ORAL
COMMUNITY
Start: 2021-01-06

## 2021-03-29 RX ORDER — LOSARTAN POTASSIUM 50 MG/1
TABLET ORAL
COMMUNITY
Start: 2021-01-28 | End: 2021-07-16

## 2021-03-29 NOTE — PROGRESS NOTES
CONSULTATION NOTE    NAME:      Fabiana Brody  :                                                          1960  DATE OF CONSULTATION:                       21  REQUESTING PHYSICIAN:                   Robert Gary MD  REASON FOR CONSULTATION:           Small cell carcinoma of the lung, limited stage at this time but work-up in progress       BRIEF HISTORY:  Fabiana Brody  is a very pleasant 61 y.o. female who presented in 2019 with an abnormal screening CT of the chest that showed a 1.1 cm right lower lobe nodule that had increased in size from 0.6 cm.  A PET scan at [facility] showed the 1.1 cm nodule in the medial aspect of the right lower lobe with FDG activity of 6.4.  This was concerning for a primary lung carcinoma..  She underwent right thoracotomy and lymph node sampling in 2019 was found to have moderately differentiated squamous cell carcinoma.  0/8 nodes were positive.  She had a CT of the chest on 2021 that showed no adenopathy but there was an 11 mm lobulated right upper lobe nodule suspicious for malignancy.  PET scan of 2021 showed abnormal hypermetabolism in the bilateral axilla and posterior right lung mass of 3.2 and increased uptake in the hilar regions was felt to be reactive.  FNA of the lung mass revealed malignant cells present and positive for TT F1.  Findings favored small cell carcinoma.  Chest wall mass was biopsied and nondiagnostic.  Bronchoscopy on 3/17/2021 revealed metastatic small cell lung cancer in station 4R and 11 R lymph nodes.  Biopsies of station 11 L and 7 were negative for malignant cells.  She was staged T1BN1 stage IIb.  The axillary adenopathy was felt to be inflammatory.  MRI of the brain was negative for metastatic disease.  I have been asked to see Ms. Brody regarding concurrent chemoradiotherapy.  Unfortunately she did not bring her films with her today.  She is scheduled for axillary biopsy on Thursday.      BMI:   "Body mass index is 44.9 kg/m².    Social History     Substance and Sexual Activity   Alcohol Use No           Allergies   Allergen Reactions   • Erythromycin Hives and Nausea Only   • Penicillins Hives and Nausea Only   • Pennsaid [Diclofenac Sodium] Other (See Comments)     Skin peels, burns & rolls off.   • Tape Other (See Comments)     Tears skin and creates wounds. (USE PAPER TAPE)   • Tetracyclines & Related Hives and Nausea Only       Social History     Tobacco Use   • Smoking status: Former Smoker     Packs/day: 1.50     Types: Cigarettes, Electronic Cigarette     Quit date: 2019     Years since quittin.7   • Smokeless tobacco: Never Used   • Tobacco comment: 40 + years; currently vaping    Substance Use Topics   • Alcohol use: No   • Drug use: No         Past Medical History:   Diagnosis Date   • Anxiety    • Arthritis    • Cancer (CMS/[facility])     nose-basal cell (skin)   • Constipation    • COPD (chronic obstructive pulmonary disease) (CMS/[facility])     rescue inhaler prn    • Cough    • [id number] intermediate metabolizer (CMS/[facility])    • CYP2C9 normal metabolizer    • CYP2D6 normal metabolizer    • 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   • Dependence on wheelchair    • Depression    • Diabetes mellitus (CMS/[facility])     type 2; no meds currently needed    • GERD (gastroesophageal reflux disease)    • History of fracture     multiple    • History of low potassium     required IV infusion x1 and takes daily po meds    • History of tobacco use 2019    quit cigarettes and switche to vaping but ready to quit    • Hypertension    • Incontinent of urine 2019    bladder stimulator in place    • Lung cancer (CMS/HCC)     RLL-monitoring since 2019   • Lung mass 2019    RLL-monitored since 2019   • Peptic ulceration    • Pneumonia    • Sensitivity to warfarin     VKORC1 \"high sensitivity to wafrarin c 13201I>A AA\"    • Sleep apnea    • Slow to wake up after anesthesia     on occasion if receives too " "much medication        family history includes Cancer in her father and mother; Depression in her mother; Diabetes in her mother; Heart failure in her mother; Hypertension in her father and mother; Kidney disease in her father; Stroke in her father and mother; Urinary tract infection in her mother.     Past Surgical History:   Procedure Laterality Date   • ANKLE CLOSED REDUCTION Left     screws inserted   • BRONCHOSCOPY THORACOTOMY Right 9/3/2019    Procedure: BRONCHOSCOPY RIGHT THORACOTOMY RIGHT LOWER LOBE WEDGE, COMPLETION RIGHT LOWER LOBE ECTOMY;  Surgeon: Phuc Cox MD;  Location: [facility];  Service: Cardiothoracic   • CATARACT EXTRACTION, BILATERAL  2014   • CHOLECYSTECTOMY     • COLONOSCOPY     • ELBOW PROCEDURE Right     plates and pin    • EYE SURGERY     • FETAL SURGERY FOR CONGENITAL HERNIA      unbilical   • HERNIA REPAIR  2016    umbilical    • JOINT REPLACEMENT Left     complete   • OTHER SURGICAL HISTORY      begign basal cell carcinoma removed from nose   • OTHER SURGICAL HISTORY      added plate & pins to right elbow to keep in place   • OTHER SURGICAL HISTORY      had bladder stim battery replaced   • POLYPECTOMY      removed from vocal cords & membranes stripped from them   • SHOULDER SURGERY Right     scraped arthritis and calcium from right shoulder and repaired labrum    • SKIN BIOPSY  2012   • TOTAL SHOULDER REPLACEMENT Left 2011   • TOTAL SHOULDER REVISION  12/31/2013    attempted    • TOTAL SHOULDER REVISION Left 2014    x2   • VOCAL CORD STRIPPING  11/22/1998    polyps removed and membranes stripped         Review of Systems - Oncology        Objective   VITAL SIGNS:   Vitals:    03/29/21 0920   BP: 131/61   Pulse: 67   Resp: 16   Temp: 97.7 °F (36.5 °C)   TempSrc: Temporal   SpO2: 98%  Comment: @ 2 liters   Weight: 94.1 kg (207 lb 8 oz)   Height: 144.8 cm (57\")   PainSc:   7   PainLoc: Back        KPS       70%    Physical Exam  Vitals reviewed.   Constitutional:       Appearance: " She is obese. She is not ill-appearing.   Eyes:      Extraocular Movements: Extraocular movements intact.   Cardiovascular:      Rate and Rhythm: Normal rate and regular rhythm.   Pulmonary:      Effort: Pulmonary effort is normal.      Breath sounds: Normal breath sounds.   Musculoskeletal:      Cervical back: Normal range of motion.   Neurological:      Mental Status: She is alert.      Cranial Nerves: No cranial nerve deficit.     She has kyphosis of the back.  The left ankle is abnormal since pinning.         The following portions of the patient's history were reviewed and updated as appropriate: allergies, current medications, past family history, past medical history, past social history, past surgical history and problem list.    Assessment      IMPRESSION:   Ms. Brody is undergoing work-up for small cell carcinoma of the lung.  At this point she appears to have limited stage disease but is undergoing biopsies of axillary lymph nodes on Thursday.      RECOMMENDATIONS: If Ms. Brody does have limited stage disease I recommend definitive chemoradiotherapy as outlined by Dr. Gary.  The pros and cons, risks and benefits of treatment were discussed and informed consent obtained.  I anticipate 60 Gray in 30 fractions.  If she has axillary nodes positive for small cell she would then be extensive stage disease and I would recommend chemotherapy alone.  We are going to try to get her PET scan here as soon as possible for our review.  Thank you very much for asking me to see Ms. Brody.               Gisselle Kaur MD      Errors in dictation may reflect use of voice recognition software and not all errors in transcription may have been detected prior to signing.

## 2021-04-05 ENCOUNTER — OFFICE VISIT (OUTPATIENT)
Dept: CARDIAC SURGERY | Facility: CLINIC | Age: 61
End: 2021-04-05

## 2021-04-05 VITALS
SYSTOLIC BLOOD PRESSURE: 132 MMHG | DIASTOLIC BLOOD PRESSURE: 72 MMHG | WEIGHT: 205 LBS | HEART RATE: 94 BPM | HEIGHT: 57 IN | OXYGEN SATURATION: 94 % | TEMPERATURE: 97.3 F | BODY MASS INDEX: 44.23 KG/M2

## 2021-04-05 DIAGNOSIS — C34.90 MALIGNANT NEOPLASM OF BRONCHUS AND LUNG (HCC): Primary | ICD-10-CM

## 2021-04-05 PROCEDURE — 99212 OFFICE O/P EST SF 10 MIN: CPT | Performed by: THORACIC SURGERY (CARDIOTHORACIC VASCULAR SURGERY)

## 2021-04-05 NOTE — PROGRESS NOTES
2021  Patient Information  Fabiana Brody                                                                                          [address] [facility] [address]   1960  'PCP/Referring Physician'  Andrea Lowery MD  [phone]  No ref. provider found    Chief Complaint   Patient presents with   • Follow-up     Returning pt referred back Mike. Robert Gary for lung nodules. Pt states that she is very tired, fatigued with SOB. Has chronic back pain, chest is very tender on the right side of her chest pectoral area, dry cough with some sputum production.        History of Present Illness:   The patient is a 61-year-old female who returns today for follow-up of lung nodules.  She recently was found, by Dr. Gary, to have a right lower lobe nodule. She underwent a biopsy of this and it is suspicious for small cell.  There appears to be small cell lung cancer in station 4R and 11 R.  She does have some increased uptake in her axillary areas bilaterally.  Dr. Gary asked me to see her in regards to my opinion.      Patient Active Problem List   Diagnosis   • Squamous cell carcinoma of lung, stage I, right (CMS/HCC)   • RYLEY    • COPD    • Hypertension   • Wheelchair dependence   • Tobacco use   • Electronic cigarette use   • Class 2 obesity in adult   • Malignant neoplasm of overlapping sites of right lung (CMS/HCC)   • Malignant neoplasm of bronchus and lung (CMS/HCC)     Past Medical History:   Diagnosis Date   • Anxiety    • Arthritis    • Cancer (CMS/HCC)     nose-basal cell (skin)   • Constipation    • COPD (chronic obstructive pulmonary disease) (CMS/HCC)     rescue inhaler prn    • Cough    • [id number] intermediate metabolizer (CMS/HCC)    • CYP2C9 normal metabolizer    • CYP2D6 normal metabolizer    • 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   • Dependence on wheelchair    • Depression    • Diabetes mellitus (CMS/HCC)     type 2; no meds currently needed    • GERD (gastroesophageal reflux  "disease)    • History of fracture     multiple    • History of low potassium     required IV infusion x1 and takes daily po meds    • History of tobacco use 2019    quit cigarettes and switche to vaping but ready to quit    • Hypertension    • Incontinent of urine 02/21/2019    bladder stimulator in place    • Lung cancer (CMS/HCC)     RLL-monitoring since feb 2019   • Lung mass 02/2019    RLL-monitored since feb 2019   • Peptic ulceration    • Pneumonia    • Sensitivity to warfarin     VKORC1 \"high sensitivity to wafrarin c 59650B>A AA\"    • Sleep apnea    • Slow to wake up after anesthesia     on occasion if receives too much medication      Past Surgical History:   Procedure Laterality Date   • ANKLE CLOSED REDUCTION Left     screws inserted   • BIOPSY OF ARM     • BRONCHOSCOPY THORACOTOMY Right 9/3/2019    Procedure: BRONCHOSCOPY RIGHT THORACOTOMY RIGHT LOWER LOBE WEDGE, COMPLETION RIGHT LOWER LOBE ECTOMY;  Surgeon: Phuc Cox MD;  Location: [facility];  Service: Cardiothoracic   • CATARACT EXTRACTION, BILATERAL  2014   • CHOLECYSTECTOMY     • COLONOSCOPY     • ELBOW PROCEDURE Right     plates and pin    • EYE SURGERY     • FETAL SURGERY FOR CONGENITAL HERNIA      unbilical   • HERNIA REPAIR  2016    umbilical    • JOINT REPLACEMENT Left     complete   • OTHER SURGICAL HISTORY      begign basal cell carcinoma removed from nose   • OTHER SURGICAL HISTORY      added plate & pins to right elbow to keep in place   • OTHER SURGICAL HISTORY      had bladder stim battery replaced   • POLYPECTOMY      removed from vocal cords & membranes stripped from them   • SHOULDER SURGERY Right     scraped arthritis and calcium from right shoulder and repaired labrum    • SKIN BIOPSY  2012   • TOTAL SHOULDER REPLACEMENT Left 2011   • TOTAL SHOULDER REVISION  12/31/2013    attempted    • TOTAL SHOULDER REVISION Left 2014    x2   • VOCAL CORD STRIPPING  11/22/1998    polyps removed and membranes stripped        Current " Outpatient Medications:   •  albuterol (PROVENTIL) (2.5 MG/3ML) 0.083% nebulizer solution, Take 2.5 mg by nebulization Every 4 (Four) Hours As Needed for Wheezing., Disp: , Rfl:   •  albuterol sulfate  (90 Base) MCG/ACT inhaler, Inhale 2 puffs Every 4 (Four) Hours As Needed for Wheezing., Disp: , Rfl:   •  ALPRAZolam (XANAX) 0.25 MG tablet, Take 0.25 mg by mouth Daily As Needed., Disp: , Rfl:   •  benzonatate (TESSALON) 100 MG capsule, TAKE 1 TO 2 CAPSULES BY MOUTH THREE TIMES DAILY AS NEEDED FOR COUGH, Disp: , Rfl:   •  carbidopa-levodopa (SINEMET)  MG per tablet, Take 1 tablet by mouth Every Night., Disp: , Rfl:   •  cetirizine (zyrTEC) 10 MG tablet, Take 10 mg by mouth Every Night., Disp: , Rfl:   •  Cholecalciferol (VITAMIN D3) 2000 units tablet, Take 2,000 Units by mouth Daily., Disp: , Rfl:   •  Cranberry 1000 MG capsule, Take 2,000 mg by mouth 2 (Two) Times a Day., Disp: , Rfl:   •  cyclobenzaprine (FLEXERIL) 10 MG tablet, Take 10 mg by mouth 3 (Three) Times a Day As Needed for Muscle Spasms., Disp: , Rfl:   •  docusate sodium (COLACE) 250 MG capsule, Take 250 mg by mouth Every Night., Disp: , Rfl:   •  escitalopram (LEXAPRO) 20 MG tablet, , Disp: , Rfl:   •  fluticasone (FLONASE) 50 MCG/ACT nasal spray, 1 spray into the nostril(s) as directed by provider Every Morning., Disp: , Rfl:   •  furosemide (LASIX) 20 MG tablet, Take 20 mg by mouth Every Morning., Disp: , Rfl:   •  HYDROcodone-acetaminophen (NORCO)  MG per tablet, TAKE 1 TABLET BY MOUTH EVERY 8 HOURS AS NEEDED FOR 30 DAYS, Disp: , Rfl: 0  •  hydrOXYzine (ATARAX) 10 MG tablet, , Disp: , Rfl:   •  losartan (COZAAR) 50 MG tablet, , Disp: , Rfl:   •  meloxicam (MOBIC) 15 MG tablet, Take 15 mg by mouth Daily., Disp: , Rfl:   •  montelukast (SINGULAIR) 10 MG tablet, Take 10 mg by mouth Every Night., Disp: , Rfl:   •  pantoprazole (PROTONIX) 20 MG EC tablet, Take 20 mg by mouth Every Morning., Disp: , Rfl:   •  potassium chloride  (MICRO-K) 10 MEQ CR capsule, Take 10 mEq by mouth 2 (Two) Times a Day., Disp: , Rfl:   •  pramipexole (MIRAPEX) 0.25 MG tablet, Take 0.5 mg by mouth At Night As Needed., Disp: , Rfl:   •  Red Yeast Rice Extract (RED YEAST RICE PO), Take 600 mg by mouth 2 (Two) Times a Day., Disp: , Rfl:   •  sodium chloride 3 % nebulizer solution, , Disp: , Rfl:   •  Spiriva Respimat 2.5 MCG/ACT aerosol solution inhaler, , Disp: , Rfl:   •  levoFLOXacin (LEVAQUIN) 750 MG tablet, TAKE 1 TABLET BY MOUTH ONCE DAILY FOR 7 DAYS, Disp: , Rfl:   •  predniSONE (DELTASONE) 20 MG tablet, TAKE 1 TABLET BY MOUTH ONCE DAILY FOR 7 DAYS, Disp: , Rfl:   Allergies   Allergen Reactions   • Erythromycin Hives and Nausea Only   • Penicillins Hives and Nausea Only   • Pennsaid [Diclofenac Sodium] Other (See Comments)     Skin peels, burns & rolls off.   • Tape Other (See Comments)     Tears skin and creates wounds, all tapes including paper tape    • Tetracyclines & Related Hives and Nausea Only     Social History     Socioeconomic History   • Marital status:      Spouse name: Not on file   • Number of children: 0   • Years of education: Not on file   • Highest education level: Not on file   Tobacco Use   • Smoking status: Former Smoker     Packs/day: 1.50     Types: Cigarettes, Electronic Cigarette     Quit date: 2019     Years since quittin.7   • Smokeless tobacco: Never Used   • Tobacco comment: 40 + years; currently vaping    Substance and Sexual Activity   • Alcohol use: No   • Drug use: No   • Sexual activity: Defer     Family History   Problem Relation Age of Onset   • Diabetes Mother    • Heart failure Mother    • Cancer Mother    • Hypertension Mother    • Depression Mother    • Urinary tract infection Mother    • Stroke Mother    • Stroke Father    • Cancer Father    • Hypertension Father    • Kidney disease Father      Review of Systems   Constitutional: Positive for malaise/fatigue. Negative for chills, fever, night sweats  "and weight loss.   HENT: Negative for congestion, hearing loss, nosebleeds and odynophagia.    Cardiovascular: Positive for chest pain (right side chest very tender at pectoal area) and leg swelling (slight ankle swelling). Negative for claudication, dyspnea on exertion, orthopnea, palpitations and syncope.   Respiratory: Positive for cough and sputum production. Negative for hemoptysis, shortness of breath and wheezing.    Endocrine: Negative for cold intolerance, heat intolerance, polydipsia, polyphagia and polyuria.   Hematologic/Lymphatic: Bruises/bleeds easily.   Skin: Positive for poor wound healing (sometimes slower healing process). Negative for itching and rash.   Musculoskeletal: Positive for arthritis, back pain and joint pain (hips, knees and both hands). Negative for joint swelling and myalgias.   Gastrointestinal: Negative for abdominal pain, constipation, diarrhea, hematemesis, melena, nausea and vomiting.   Genitourinary: Positive for bladder incontinence, frequency and hesitancy. Negative for dysuria, hematuria, nocturia and urgency.   Neurological: Positive for loss of balance and numbness (fingers numb on both hands). Negative for dizziness and light-headedness.   Psychiatric/Behavioral: Positive for depression. Negative for suicidal ideas. The patient has insomnia and is nervous/anxious.    Allergic/Immunologic: Positive for environmental allergies. Negative for HIV exposure.     Vitals:    04/05/21 1109   BP: 132/72   Pulse: 94   Temp: 97.3 °F (36.3 °C)   SpO2: 94%   Weight: 93 kg (205 lb)   Height: 144.8 cm (57\")      Physical Exam  Vitals and nursing note reviewed.   Cardiovascular:      Rate and Rhythm: Normal rate and regular rhythm.      Pulses: Normal pulses.      Heart sounds: Normal heart sounds.   Pulmonary:      Effort: Pulmonary effort is normal.      Breath sounds: Normal breath sounds.   Abdominal:      General: Abdomen is flat. Bowel sounds are normal.      Palpations: Abdomen is " soft.   Musculoskeletal:      Cervical back: Normal range of motion and neck supple.         The ROS, past medical history, surgical history, family history, social history and vitals were reviewed by myself and corrected as needed.      Labs/Imaging:  I have obtained and reviewed the medical records from Dr. Gary, including the PET scan demonstrating, what appears to be small cell cancer.    Assessment/Plan:   The patient is a 61-year-old  female who I performed a right lower lobectomy in 2019 for squamous cell carcinoma.  She was a stage I tumor at that time.  She now presents at this time with what appears to be small cell cancer.  I have obtained and reviewed her PET scan and I concur with the increased uptake in the nodule.  I have reviewed her CT scan in detail and agree with the size of the nodule.  At this point, I feel that without question, I would proceed with medical management of this and hematology/oncology with Dr. Gary.  I have attempted to call  and I left an extensive message with him.  I have explained to both the patient and her family that I agree with Dr. Gary that I think oncology treatment at this point is the best option.  She appears to understand that and has no further questions.  I will see her back as needed.    Patient Active Problem List   Diagnosis   • Squamous cell carcinoma of lung, stage I, right (CMS/HCC)   • RYLEY    • COPD    • Hypertension   • Wheelchair dependence   • Tobacco use   • Electronic cigarette use   • Class 2 obesity in adult   • Malignant neoplasm of overlapping sites of right lung (CMS/HCC)   • Malignant neoplasm of bronchus and lung (CMS/HCC)     CC: MD Andrea Dimas MD Regina Fugate editing for Phuc Cox MD      I, Phuc Cox MD, have read and agree with the editing done by Karo Miranda, .

## 2021-04-06 ENCOUNTER — HOSPITAL ENCOUNTER (OUTPATIENT)
Dept: RADIATION ONCOLOGY | Facility: HOSPITAL | Age: 61
Discharge: HOME | End: 2021-04-06

## 2021-04-06 ENCOUNTER — HOSPITAL ENCOUNTER (OUTPATIENT)
Dept: RADIATION ONCOLOGY | Facility: HOSPITAL | Age: 61
Setting detail: RADIATION/ONCOLOGY SERIES
Discharge: HOME | End: 2021-04-06

## 2021-04-06 PROCEDURE — 77290 THER RAD SIMULAJ FIELD CPLX: CPT | Performed by: RADIOLOGY

## 2021-04-22 PROCEDURE — 77338 DESIGN MLC DEVICE FOR IMRT: CPT | Performed by: RADIOLOGY

## 2021-04-22 PROCEDURE — 77300 RADIATION THERAPY DOSE PLAN: CPT | Performed by: RADIOLOGY

## 2021-04-22 PROCEDURE — 77301 RADIOTHERAPY DOSE PLAN IMRT: CPT | Performed by: RADIOLOGY

## 2021-04-26 ENCOUNTER — HOSPITAL ENCOUNTER (OUTPATIENT)
Dept: RADIATION ONCOLOGY | Facility: HOSPITAL | Age: 61
Discharge: HOME | End: 2021-04-26

## 2021-04-26 PROCEDURE — 77280 THER RAD SIMULAJ FIELD SMPL: CPT | Performed by: RADIOLOGY

## 2021-04-28 ENCOUNTER — HOSPITAL ENCOUNTER (OUTPATIENT)
Dept: RADIATION ONCOLOGY | Facility: HOSPITAL | Age: 61
Discharge: HOME | End: 2021-04-28

## 2021-04-28 PROCEDURE — 77386: CPT | Performed by: RADIOLOGY

## 2021-04-28 PROCEDURE — 77412 RADIATION TX DELIVERY LVL 3: CPT | Performed by: RADIOLOGY

## 2021-04-28 PROCEDURE — 77387 GUIDANCE FOR RADJ TX DLVR: CPT | Performed by: RADIOLOGY

## 2021-04-29 ENCOUNTER — HOSPITAL ENCOUNTER (OUTPATIENT)
Dept: RADIATION ONCOLOGY | Facility: HOSPITAL | Age: 61
Discharge: HOME | End: 2021-04-29

## 2021-04-29 PROCEDURE — 77336 RADIATION PHYSICS CONSULT: CPT | Performed by: RADIOLOGY

## 2021-04-29 PROCEDURE — 77412 RADIATION TX DELIVERY LVL 3: CPT | Performed by: RADIOLOGY

## 2021-04-29 PROCEDURE — 77386: CPT | Performed by: RADIOLOGY

## 2021-04-29 PROCEDURE — 77387 GUIDANCE FOR RADJ TX DLVR: CPT | Performed by: RADIOLOGY

## 2021-04-30 ENCOUNTER — HOSPITAL ENCOUNTER (OUTPATIENT)
Dept: RADIATION ONCOLOGY | Facility: HOSPITAL | Age: 61
Discharge: HOME | End: 2021-04-30

## 2021-04-30 PROCEDURE — 77387 GUIDANCE FOR RADJ TX DLVR: CPT | Performed by: RADIOLOGY

## 2021-04-30 PROCEDURE — 77386: CPT | Performed by: RADIOLOGY

## 2021-04-30 PROCEDURE — 77412 RADIATION TX DELIVERY LVL 3: CPT | Performed by: RADIOLOGY

## 2021-05-03 ENCOUNTER — HOSPITAL ENCOUNTER (OUTPATIENT)
Dept: RADIATION ONCOLOGY | Facility: HOSPITAL | Age: 61
Discharge: HOME | End: 2021-05-03

## 2021-05-03 ENCOUNTER — HOSPITAL ENCOUNTER (OUTPATIENT)
Dept: RADIATION ONCOLOGY | Facility: HOSPITAL | Age: 61
Setting detail: RADIATION/ONCOLOGY SERIES
Discharge: HOME | End: 2021-05-03

## 2021-05-03 PROCEDURE — 77386: CPT | Performed by: RADIOLOGY

## 2021-05-03 PROCEDURE — 77412 RADIATION TX DELIVERY LVL 3: CPT | Performed by: RADIOLOGY

## 2021-05-04 ENCOUNTER — HOSPITAL ENCOUNTER (OUTPATIENT)
Dept: RADIATION ONCOLOGY | Facility: HOSPITAL | Age: 61
Discharge: HOME | End: 2021-05-04

## 2021-05-04 VITALS — WEIGHT: 210.3 LBS | BODY MASS INDEX: 45.51 KG/M2

## 2021-05-04 PROCEDURE — 77386: CPT | Performed by: RADIOLOGY

## 2021-05-05 ENCOUNTER — DOCUMENTATION (OUTPATIENT)
Dept: NUTRITION | Facility: HOSPITAL | Age: 61
End: 2021-05-05

## 2021-05-05 ENCOUNTER — HOSPITAL ENCOUNTER (OUTPATIENT)
Dept: RADIATION ONCOLOGY | Facility: HOSPITAL | Age: 61
Discharge: HOME | End: 2021-05-05

## 2021-05-05 PROCEDURE — 77386: CPT | Performed by: RADIOLOGY

## 2021-05-05 NOTE — PROGRESS NOTES
"   Outpatient Oncology Nutrition     Reason for Visit:     Oncology Nutrition Screening and Patient Education    Patient Name:  Fabiana Brody    :  1960    MRN:  [id number]    Date of Encounter: 2021    Nutrition Assessment     Diagnosis:  Small cell lung cancer / bronchoscopy on 3/17/2021 revealed metastatic small cell lung cancer in station 4R and 11 R lymph nodes / biopsies of station 11 L and 7 were negative for malignant cells / T1BN1 stage IIb    Surgery:  Right thoracotomy and lymph node sampling in 2019     Radiation:  60 Gray in 30 fractions / patient has completed 5 fractions    Patient Active Problem List:    Patient Active Problem List   Diagnosis   • Squamous cell carcinoma of lung, stage I, right (CMS/HCC)   • RYLEY    • COPD    • Hypertension   • Wheelchair dependence   • Tobacco use   • Electronic cigarette use   • Class 2 obesity in adult   • Malignant neoplasm of overlapping sites of right lung (CMS/HCC)   • Malignant neoplasm of bronchus and lung (CMS/HCC)       Food / Nutrition Related History       Hydration Status     Goal:  Currently on fluid restriction 1500 ml per day    Enteral Feeding       Anthropometric Measurements     Height:    Ht Readings from Last 1 Encounters:   21 144.8 cm (57\")       Weight:    Wt Readings from Last 1 Encounters:   21 95.4 kg (210 lb 4.8 oz)       BMI: 45.51 / Morbid obesity    Weight Change: 20-30 lbs weight gain over the past 2 years       Review of Lab Data (Time Frame - 1 month / 2 month)       Medication Review   Reviewed 21    Nutrition Focused Physical Findings       Nutrition Impact Symptoms       Physical Activity      Able to do little activity and spend most of the day in bed or chair    Current Nutritional Intake     Oral diet:  Regular with fluid restriction 1500 ml per day    Oral nutritional supplements: As an inpatient, patient received Boost Plus supplements TID which were counted within the 1500 ml fluid " restriction    Malnutrition Risk Assessment     Recent weight loss over the past 6 months:  0 = No    How much weight loss:      Eating poorly because of a decreased appetite:  1 = Yes    Malnutrition Screening Score:     MST = 0 or 1 Patient not at risk for malnutrition    Nutrition Diagnosis     Problem    Etiology    Signs / Symptoms      Nutrition Intervention   Brief initial consultation with patient during RAD ONC status checks.  Patient states that she is eating better following hospital discharge; her niece accompanies her to most of her appointments and does some of the food preparation for her and her .  The niece confirms that the patient is eating better.      Reinforced the role of nutrition with diagnosis and treatment; possible nutritionally related side effect of treatments that she may encounter with progression of treatment and tips for management.  Will follow patient closely through course of treatment.     Goal       Monitoring / Evaluation

## 2021-05-06 ENCOUNTER — HOSPITAL ENCOUNTER (OUTPATIENT)
Dept: RADIATION ONCOLOGY | Facility: HOSPITAL | Age: 61
Discharge: HOME | End: 2021-05-06

## 2021-05-06 PROCEDURE — 77386: CPT | Performed by: RADIOLOGY

## 2021-05-07 ENCOUNTER — HOSPITAL ENCOUNTER (OUTPATIENT)
Dept: RADIATION ONCOLOGY | Facility: HOSPITAL | Age: 61
Discharge: HOME | End: 2021-05-07

## 2021-05-07 PROCEDURE — 77387 GUIDANCE FOR RADJ TX DLVR: CPT | Performed by: RADIOLOGY

## 2021-05-07 PROCEDURE — 77336 RADIATION PHYSICS CONSULT: CPT | Performed by: RADIOLOGY

## 2021-05-07 PROCEDURE — 77386: CPT | Performed by: RADIOLOGY

## 2021-06-01 ENCOUNTER — HOSPITAL ENCOUNTER (OUTPATIENT)
Dept: RADIATION ONCOLOGY | Facility: HOSPITAL | Age: 61
Setting detail: RADIATION/ONCOLOGY SERIES
Discharge: HOME | End: 2021-06-01

## 2021-06-08 ENCOUNTER — TELEPHONE (OUTPATIENT)
Dept: RADIATION ONCOLOGY | Facility: HOSPITAL | Age: 61
End: 2021-06-08

## 2021-06-09 ENCOUNTER — TELEPHONE (OUTPATIENT)
Dept: RADIATION ONCOLOGY | Facility: HOSPITAL | Age: 61
End: 2021-06-09

## 2021-06-09 NOTE — TELEPHONE ENCOUNTER
Patient called stating she has just been home for one week but was still recovering.  Patient stated she did not have a f/u appointment with Dr. Gary yet because he wanted her to recover.  Patient wasn't sure if she was going to move forward with treatment and if she did move forward, she wasn't sure when.  After discussion with Dr. Kaur, we told the patient we would stop her treatment at this time and wait on another referral from Dr. Gary.  The patient was very happy with this plan and stated she would call me if there were any changes.

## 2021-07-15 ENCOUNTER — TELEPHONE (OUTPATIENT)
Dept: RADIATION ONCOLOGY | Facility: HOSPITAL | Age: 61
End: 2021-07-15

## 2021-07-15 NOTE — TELEPHONE ENCOUNTER
Radiation Oncology, appointment reminder. LVM for patient reminding her pf appointment and to make sure she brings a copy of her films with her.

## 2021-07-16 ENCOUNTER — OFFICE VISIT (OUTPATIENT)
Dept: RADIATION ONCOLOGY | Facility: HOSPITAL | Age: 61
End: 2021-07-16

## 2021-07-16 ENCOUNTER — HOSPITAL ENCOUNTER (OUTPATIENT)
Dept: RADIATION ONCOLOGY | Facility: HOSPITAL | Age: 61
Setting detail: RADIATION/ONCOLOGY SERIES
Discharge: HOME | End: 2021-07-16

## 2021-07-16 VITALS
WEIGHT: 230.2 LBS | HEART RATE: 86 BPM | BODY MASS INDEX: 53.28 KG/M2 | TEMPERATURE: 98.4 F | HEIGHT: 55 IN | DIASTOLIC BLOOD PRESSURE: 65 MMHG | RESPIRATION RATE: 19 BRPM | OXYGEN SATURATION: 94 % | SYSTOLIC BLOOD PRESSURE: 132 MMHG

## 2021-07-16 DIAGNOSIS — C34.91 SQUAMOUS CELL CARCINOMA OF LUNG, STAGE I, RIGHT (HCC): Primary | ICD-10-CM

## 2021-07-16 PROCEDURE — G0463 HOSPITAL OUTPT CLINIC VISIT: HCPCS

## 2021-07-16 RX ORDER — ROPINIROLE 0.5 MG/1
0.5 TABLET, FILM COATED ORAL 3 TIMES DAILY
COMMUNITY

## 2021-07-16 NOTE — PROGRESS NOTES
CONSULTATION NOTE    NAME:      Fabiana Brody  :                                                          1960  DATE OF CONSULTATION:                       21  REQUESTING PHYSICIAN:                   Robert Gary MD  REASON FOR CONSULTATION:           Cancer Staging  Limited stage small cell carcinoma of the lung  H/O Squamous cell carcinoma of lung, stage I, right (CMS/HCC)  Staging form: Lung, AJCC 8th Edition             BRIEF HISTORY: Fabiana Brody  is a very pleasant 61 y.o. female who underwent right thoracotomy and lymph node sampling in 2019 for moderately differentiated squamous cell carcinoma.  0/8 nodes were positive.  CT of the chest 2021 showed no adenopathy but there was an 11 mm lobulated right upper lobe nodule suspicious for malignancy.   PET scan 2021 showed abnormal hypermetabolism in the bilateral axilla and posterior right lung mass of 3.2 and increased uptake in the hilar regions was felt to be reactive.  FNA of the lung mass revealed malignant cells present and positive for TT F1.  Findings favored small cell carcinoma.  Chest wall mass was biopsied and nondiagnostic.  Bronchoscopy on 3/17/2021 revealed metastatic small cell lung cancer in station 4R and 11 R lymph nodes.  Biopsies of station 11 L and 7 were negative for malignant cells.  She was staged T1BN1 stage IIb.  The axillary adenopathy was felt to be inflammatory.  MRI of the brain was negative for metastatic disease.  Ms. Brody received radiotherapy in our department.  -2021 the PET + lung cancer received 16 Gy in 8 fractions with 6 MV photons of a planned 60 Gy in 30 fractions. Ms. Brody was hospitalized from 2021 to 2021 for acute respiratory failure.  She was then discharged to a rehabilitation facility.    Ms. Brody returns today and would like to undergo radiotherapy.  She continues on 4 L nasal cannula oxygen.  Her most recent chemo was 2021. recent  scans revealed no new metastatic disease in the chest.      She said she has symptoms of fatigue and dizziness that are new.  She continues to have increased shortness of breath, cough productive of clear mucus.  She is here to discuss resuming radiotherapy.            BMI:  Body mass index is 321.38 kg/m².      Social History     Substance and Sexual Activity   Alcohol Use No       Allergies   Allergen Reactions   • Levaquin [Levofloxacin] Anaphylaxis   • Erythromycin Hives and Nausea Only   • Penicillins Hives and Nausea Only   • Pennsaid [Diclofenac Sodium] Other (See Comments)     Skin peels, burns & rolls off.   • Tape Other (See Comments)     Tears skin and creates wounds, all tapes including paper tape    • Tetracyclines & Related Hives and Nausea Only       Social History     Tobacco Use   • Smoking status: Former Smoker     Packs/day: 1.50     Types: Cigarettes, Electronic Cigarette     Quit date: 2019     Years since quittin.0   • Smokeless tobacco: Never Used   • Tobacco comment: 40 + years; currently vaping    Substance Use Topics   • Alcohol use: No   • Drug use: No         Past Medical History:   Diagnosis Date   • Anxiety    • Arthritis    • Cancer (CMS/HCC)     nose-basal cell (skin)   • Constipation    • COPD (chronic obstructive pulmonary disease) (CMS/HCC)     rescue inhaler prn    • Cough    • [id number] intermediate metabolizer (CMS/HCC)    • CYP2C9 normal metabolizer    • CYP2D6 normal metabolizer    • 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   • Dependence on wheelchair    • Depression    • Diabetes mellitus (CMS/HCC)     type 2; no meds currently needed    • GERD (gastroesophageal reflux disease)    • History of fracture     multiple    • History of low potassium     required IV infusion x1 and takes daily po meds    • History of tobacco use 2019    quit cigarettes and switche to vaping but ready to quit    • Hypertension    • Incontinent of urine 2019    bladder stimulator in  "place    • Lung cancer (CMS/HCC)     RLL-monitoring since feb 2019   • Lung mass 02/2019    RLL-monitored since feb 2019   • Peptic ulceration    • Pneumonia    • Sensitivity to warfarin     VKORC1 \"high sensitivity to wafrarin c 04021W>A AA\"    • Sleep apnea    • Slow to wake up after anesthesia     on occasion if receives too much medication        family history includes Cancer in her father and mother; Depression in her mother; Diabetes in her mother; Heart failure in her mother; Hypertension in her father and mother; Kidney disease in her father; Stroke in her father and mother; Urinary tract infection in her mother.     Past Surgical History:   Procedure Laterality Date   • ANKLE CLOSED REDUCTION Left     screws inserted   • BIOPSY OF ARM     • BRONCHOSCOPY THORACOTOMY Right 9/3/2019    Procedure: BRONCHOSCOPY RIGHT THORACOTOMY RIGHT LOWER LOBE WEDGE, COMPLETION RIGHT LOWER LOBE ECTOMY;  Surgeon: Phuc oCx MD;  Location: [facility];  Service: Cardiothoracic   • CATARACT EXTRACTION, BILATERAL  2014   • CHOLECYSTECTOMY     • COLONOSCOPY     • ELBOW PROCEDURE Right     plates and pin    • EYE SURGERY     • FETAL SURGERY FOR CONGENITAL HERNIA      unbilical   • HERNIA REPAIR  2016    umbilical    • JOINT REPLACEMENT Left     complete   • OTHER SURGICAL HISTORY      begign basal cell carcinoma removed from nose   • OTHER SURGICAL HISTORY      added plate & pins to right elbow to keep in place   • OTHER SURGICAL HISTORY      had bladder stim battery replaced   • POLYPECTOMY      removed from vocal cords & membranes stripped from them   • SHOULDER SURGERY Right     scraped arthritis and calcium from right shoulder and repaired labrum    • SKIN BIOPSY  2012   • TOTAL SHOULDER REPLACEMENT Left 2011   • TOTAL SHOULDER REVISION  12/31/2013    attempted    • TOTAL SHOULDER REVISION Left 2014    x2   • VOCAL CORD STRIPPING  11/22/1998    polyps removed and membranes stripped         Review of Systems " "  Neurological: Positive for dizziness.        Wheelchair-bound   All other systems reviewed and are negative.          Objective   VITAL SIGNS:   Vitals:    07/16/21 1308   BP: 132/65   Pulse: 86   Resp: 19   Temp: 98.4 °F (36.9 °C)   SpO2: 94%  Comment: @ 4 liters   Weight: 104 kg (230 lb 3.2 oz)   Height: 57 cm (22.44\")   PainSc:   6   PainLoc: Back        KPS      70%    Physical Exam  Constitutional:       General: She is not in acute distress.     Appearance: Normal appearance.   Cardiovascular:      Rate and Rhythm: Normal rate and regular rhythm.   Pulmonary:      Effort: Pulmonary effort is normal.      Breath sounds: Normal breath sounds.      Comments: On 4 L nasal cannula  Neurological:      Mental Status: She is alert.              The following portions of the patient's history were reviewed and updated as appropriate: allergies, current medications, past family history, past medical history, past social history, past surgical history and problem list.    Assessment      IMPRESSION:   Ms. Brody has been diagnosed with limited stage small cell carcinoma lung.  We had started radiotherapy with chemotherapy but she went into acute respiratory failure and has been hospitalized admitted to rehab.      RECOMMENDATIONS: Before proceeding with repeat treatment planning on going to get an MRI of the brain and will consider a PET scan to complete staging.  Based on the results of the studies we will continue with treatment planning given equivalent of 60 Gy in 30 fractions to the involved lung mass and nodes.  It is a pleasure to see Ms. Brody again and she looks great.             Gisselle Kaur MD      Errors in dictation may reflect use of voice recognition software and not all errors in transcription may have been detected prior to signing.  "

## 2021-07-20 DIAGNOSIS — C34.91 SQUAMOUS CELL CARCINOMA OF LUNG, STAGE I, RIGHT (HCC): Primary | ICD-10-CM

## 2021-07-21 DIAGNOSIS — C79.9 METASTATIC MALIGNANT NEOPLASM, UNSPECIFIED SITE (HCC): Primary | ICD-10-CM

## 2021-07-29 ENCOUNTER — APPOINTMENT (OUTPATIENT)
Dept: PET IMAGING | Facility: HOSPITAL | Age: 61
End: 2021-07-29

## 2021-07-29 ENCOUNTER — TRANSCRIBE ORDERS (OUTPATIENT)
Dept: ADMINISTRATIVE | Facility: HOSPITAL | Age: 61
End: 2021-07-29

## 2021-07-29 DIAGNOSIS — C34.11 MALIGNANT NEOPLASM OF UPPER LOBE OF RIGHT LUNG (HCC): Primary | ICD-10-CM

## 2021-07-30 ENCOUNTER — APPOINTMENT (OUTPATIENT)
Dept: PET IMAGING | Facility: HOSPITAL | Age: 61
End: 2021-07-30

## 2021-07-30 ENCOUNTER — HOSPITAL ENCOUNTER (OUTPATIENT)
Dept: PET IMAGING | Facility: HOSPITAL | Age: 61
End: 2021-07-30

## 2021-07-30 ENCOUNTER — HOSPITAL ENCOUNTER (OUTPATIENT)
Dept: PET IMAGING | Facility: HOSPITAL | Age: 61
Discharge: HOME | End: 2021-07-30

## 2021-07-30 LAB — GLUCOSE BLDC GLUCOMTR-MCNC: 108 MG/DL (ref 70–130)

## 2021-07-30 PROCEDURE — 78815 PET IMAGE W/CT SKULL-THIGH: CPT

## 2021-07-30 PROCEDURE — A9552 F18 FDG: HCPCS | Performed by: RADIOLOGY

## 2021-07-30 PROCEDURE — 82962 GLUCOSE BLOOD TEST: CPT

## 2021-07-30 PROCEDURE — 0 FLUDEOXYGLUCOSE F18 SOLUTION: Performed by: RADIOLOGY

## 2021-07-30 RX ADMIN — FLUDEOXYGLUCOSE F18 1 DOSE: 300 INJECTION INTRAVENOUS at 12:45

## 2021-08-09 ENCOUNTER — TELEPHONE (OUTPATIENT)
Dept: RADIATION ONCOLOGY | Facility: HOSPITAL | Age: 61
End: 2021-08-09

## 2021-08-09 NOTE — TELEPHONE ENCOUNTER
Pt called asking for results of PET scan.  I discussed with Dr. Kaur and she request I call patient back and give her an appointment for re-evaluation and sim.  I called patient and told her Dr. Kaur has all she needs with the PET scan and we will call her with an appointment.  She verbalized understanding.

## 2021-08-13 ENCOUNTER — OFFICE VISIT (OUTPATIENT)
Dept: RADIATION ONCOLOGY | Facility: HOSPITAL | Age: 61
End: 2021-08-13

## 2021-08-13 ENCOUNTER — HOSPITAL ENCOUNTER (OUTPATIENT)
Dept: RADIATION ONCOLOGY | Facility: HOSPITAL | Age: 61
Setting detail: RADIATION/ONCOLOGY SERIES
Discharge: HOME | End: 2021-08-13

## 2021-08-13 VITALS
WEIGHT: 201.3 LBS | SYSTOLIC BLOOD PRESSURE: 171 MMHG | OXYGEN SATURATION: 97 % | TEMPERATURE: 97.3 F | BODY MASS INDEX: 46.59 KG/M2 | HEART RATE: 84 BPM | DIASTOLIC BLOOD PRESSURE: 78 MMHG | RESPIRATION RATE: 16 BRPM | HEIGHT: 55 IN

## 2021-08-13 DIAGNOSIS — C34.81 MALIGNANT NEOPLASM OF OVERLAPPING SITES OF RIGHT LUNG (HCC): Primary | ICD-10-CM

## 2021-08-13 PROCEDURE — 77334 RADIATION TREATMENT AID(S): CPT | Performed by: RADIOLOGY

## 2021-08-13 PROCEDURE — 77290 THER RAD SIMULAJ FIELD CPLX: CPT | Performed by: RADIOLOGY

## 2021-08-13 RX ORDER — HYDROXYZINE HYDROCHLORIDE 25 MG/1
TABLET, FILM COATED ORAL
COMMUNITY
Start: 2021-06-25

## 2021-08-13 RX ORDER — PANTOPRAZOLE SODIUM 40 MG/1
TABLET, DELAYED RELEASE ORAL
COMMUNITY
Start: 2021-08-11

## 2021-08-13 NOTE — PROGRESS NOTES
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 female who underwent right thoracotomy and lymph node sampling in 2019 for moderately differentiated squamous cell carcinoma.  0/8 nodes were positive.  CT of the chest 2021 showed no adenopathy but there was an 11 mm lobulated right upper lobe nodule suspicious for malignancy.   PET scan 2021 showed abnormal hypermetabolism in the bilateral axilla and posterior right lung mass of 3.2 and increased uptake in the hilar regions was felt to be reactive.  FNA of the lung mass revealed malignant cells present and positive for TT F1.  Findings favored small cell carcinoma.  Chest wall mass was biopsied and nondiagnostic.  Bronchoscopy on 3/17/2021 revealed metastatic small cell lung cancer in station 4R and 11 R lymph nodes.  Biopsies of station 11 L and 7 were negative for malignant cells.  She was staged T1BN1 stage IIb.  The axillary adenopathy was felt to be inflammatory.  MRI of the brain was negative for metastatic disease.  Ms. Brody received radiotherapy in our department.  -2021 the PET + lung cancer received 16 Gy in 8 fractions with 6 MV photons of a planned 60 Gy in 30 fractions. Ms. Brody was hospitalized from 2021 to 2021 for acute respiratory failure.  She was discharged to a rehabilitation facility.     Ms. Brody returned in July to restart radiotherapy.  She continued on 4 L nasal cannula oxygen.  Her most recent chemo was 2021. recent scans revealed no new metastatic disease in the chest.       She said she had new symptoms of fatigue  "and dizziness and continued to have increased shortness of breath and cough productive of clear mucus. PET scan of 7/30/2021  showed:  1. Decreasing hypermetabolic activity in the patient's right and left axillary/chest wall masses as compared to outside exam of 02/22/2021.   2. Moderately hypermetabolic activity in the left sternoclavicular joint, which appears to represent interval development of extensive degenerative change. There may be an underlying lytic lesion of the distal left clavicle, however.    Allergies   Allergen Reactions   • Levaquin [Levofloxacin] Anaphylaxis   • Erythromycin Hives and Nausea Only   • Penicillins Hives and Nausea Only   • Pennsaid [Diclofenac Sodium] Other (See Comments)     Skin peels, burns & rolls off.   • Tape Other (See Comments)     Tears skin and creates wounds, all tapes including paper tape    • Tetracyclines & Related Hives and Nausea Only       Review of Systems   Constitutional: Positive for fatigue.   Genitourinary: Positive for bladder incontinence and frequency.    Musculoskeletal: Positive for arthralgias, back pain, gait problem and myalgias.        Chronic pain syndrome   Neurological: Positive for gait problem and headaches.        Patient is in a wheelchair.   All other systems reviewed and are negative.          Objective   VITAL SIGNS:   Vitals:    08/13/21 1001   BP: 171/78   Pulse: 84   Resp: 16   Temp: 97.3 °F (36.3 °C)   SpO2: 97%  Comment: RA   Weight: 91.3 kg (201 lb 4.8 oz)   Height: 57 cm (22.44\")   PainSc:   5   PainLoc: Back  Comment: lower back        KPS 70%    Physical Exam  Constitutional:       Appearance: She is obese.   Cardiovascular:      Rate and Rhythm: Normal rate and regular rhythm.   Pulmonary:      Effort: Pulmonary effort is normal.      Breath sounds: Normal breath sounds.      Comments: On O2  Neurological:      Mental Status: She is alert.              The following portions of the patient's history were reviewed and updated as " appropriate: allergies, current medications, past family history, past medical history, past social history, past surgical history and problem list.    Diagnoses and all orders for this visit:    Malignant neoplasm of overlapping sites of right lung (CMS/HCC)    Other orders  -     hydrOXYzine (ATARAX) 25 MG tablet  -     pantoprazole (PROTONIX) 40 MG EC tablet      IMPRESSION: Ms. Brody has small cell carcinoma of the lung, limited stage.  She originally was getting concurrent chemoradiotherapy but went into respiratory failure she returns now for radiotherapy alone.    RECOMMENDATIONS: I recommend radiotherapy to the original tumor volume.  The pros and cons, risks and benefits of treatment were discussed with Ms. Brody and informed consent obtained.  We will proceed with treatment planning today and I will give her a total treatment of 60 Gray in 30 fractions taking into account the previously treated region.  Thank you very much for letting me participate in the care of Ms. Brody.       Gisselle Kaur MD

## 2021-08-17 PROCEDURE — 77399 UNLISTED PX MED RADJ PHYSICS: CPT | Performed by: RADIOLOGY

## 2021-08-20 PROCEDURE — 77338 DESIGN MLC DEVICE FOR IMRT: CPT | Performed by: RADIOLOGY

## 2021-08-20 PROCEDURE — 77301 RADIOTHERAPY DOSE PLAN IMRT: CPT | Performed by: RADIOLOGY

## 2021-08-20 PROCEDURE — 77300 RADIATION THERAPY DOSE PLAN: CPT | Performed by: RADIOLOGY

## 2021-08-25 ENCOUNTER — HOSPITAL ENCOUNTER (OUTPATIENT)
Dept: RADIATION ONCOLOGY | Facility: HOSPITAL | Age: 61
Discharge: HOME | End: 2021-08-25

## 2021-08-30 ENCOUNTER — HOSPITAL ENCOUNTER (OUTPATIENT)
Dept: RADIATION ONCOLOGY | Facility: HOSPITAL | Age: 61
Discharge: HOME | End: 2021-08-30

## 2021-08-30 PROCEDURE — 77386: CPT | Performed by: RADIOLOGY

## 2021-08-31 ENCOUNTER — HOSPITAL ENCOUNTER (OUTPATIENT)
Dept: RADIATION ONCOLOGY | Facility: HOSPITAL | Age: 61
Discharge: HOME | End: 2021-08-31

## 2021-08-31 VITALS — WEIGHT: 198.8 LBS | BODY MASS INDEX: 277.55 KG/M2

## 2021-08-31 PROCEDURE — 77386: CPT | Performed by: RADIOLOGY

## 2021-09-01 ENCOUNTER — HOSPITAL ENCOUNTER (OUTPATIENT)
Dept: RADIATION ONCOLOGY | Facility: HOSPITAL | Age: 61
Setting detail: RADIATION/ONCOLOGY SERIES
Discharge: HOME | End: 2021-09-01

## 2021-09-01 ENCOUNTER — HOSPITAL ENCOUNTER (OUTPATIENT)
Dept: RADIATION ONCOLOGY | Facility: HOSPITAL | Age: 61
Discharge: HOME | End: 2021-09-01

## 2021-09-01 PROCEDURE — 77386: CPT | Performed by: RADIOLOGY

## 2021-09-02 ENCOUNTER — HOSPITAL ENCOUNTER (OUTPATIENT)
Dept: RADIATION ONCOLOGY | Facility: HOSPITAL | Age: 61
Discharge: HOME | End: 2021-09-02

## 2021-09-02 PROCEDURE — 77336 RADIATION PHYSICS CONSULT: CPT | Performed by: RADIOLOGY

## 2021-09-02 PROCEDURE — 77386: CPT | Performed by: RADIOLOGY

## 2021-09-03 ENCOUNTER — HOSPITAL ENCOUNTER (OUTPATIENT)
Dept: RADIATION ONCOLOGY | Facility: HOSPITAL | Age: 61
Discharge: HOME | End: 2021-09-03

## 2021-09-03 PROCEDURE — 77386: CPT | Performed by: RADIOLOGY

## 2021-09-07 ENCOUNTER — HOSPITAL ENCOUNTER (OUTPATIENT)
Dept: RADIATION ONCOLOGY | Facility: HOSPITAL | Age: 61
Discharge: HOME | End: 2021-09-07

## 2021-09-07 VITALS — BODY MASS INDEX: 276.43 KG/M2 | WEIGHT: 198 LBS

## 2021-09-07 PROCEDURE — 77386: CPT | Performed by: RADIOLOGY

## 2021-09-08 ENCOUNTER — HOSPITAL ENCOUNTER (OUTPATIENT)
Dept: RADIATION ONCOLOGY | Facility: HOSPITAL | Age: 61
Discharge: HOME | End: 2021-09-08

## 2021-09-08 PROCEDURE — 77386: CPT | Performed by: RADIOLOGY

## 2021-09-09 ENCOUNTER — HOSPITAL ENCOUNTER (OUTPATIENT)
Dept: RADIATION ONCOLOGY | Facility: HOSPITAL | Age: 61
Discharge: HOME | End: 2021-09-09

## 2021-09-09 PROCEDURE — 77386: CPT | Performed by: RADIOLOGY

## 2021-09-10 ENCOUNTER — HOSPITAL ENCOUNTER (OUTPATIENT)
Dept: RADIATION ONCOLOGY | Facility: HOSPITAL | Age: 61
Discharge: HOME | End: 2021-09-10

## 2021-09-10 PROCEDURE — 77336 RADIATION PHYSICS CONSULT: CPT | Performed by: RADIOLOGY

## 2021-09-10 PROCEDURE — 77386: CPT | Performed by: RADIOLOGY

## 2021-09-13 ENCOUNTER — HOSPITAL ENCOUNTER (OUTPATIENT)
Dept: RADIATION ONCOLOGY | Facility: HOSPITAL | Age: 61
Discharge: HOME | End: 2021-09-13

## 2021-09-13 PROCEDURE — 77386: CPT | Performed by: RADIOLOGY

## 2021-09-14 ENCOUNTER — HOSPITAL ENCOUNTER (OUTPATIENT)
Dept: RADIATION ONCOLOGY | Facility: HOSPITAL | Age: 61
Discharge: HOME | End: 2021-09-14

## 2021-09-14 VITALS — WEIGHT: 194.6 LBS | BODY MASS INDEX: 271.68 KG/M2

## 2021-09-14 PROCEDURE — 77386: CPT | Performed by: RADIOLOGY

## 2021-09-15 ENCOUNTER — HOSPITAL ENCOUNTER (OUTPATIENT)
Dept: RADIATION ONCOLOGY | Facility: HOSPITAL | Age: 61
Discharge: HOME | End: 2021-09-15

## 2021-09-15 PROCEDURE — 77386: CPT | Performed by: RADIOLOGY

## 2021-09-16 ENCOUNTER — DOCUMENTATION (OUTPATIENT)
Dept: NUTRITION | Facility: HOSPITAL | Age: 61
End: 2021-09-16

## 2021-09-16 ENCOUNTER — HOSPITAL ENCOUNTER (OUTPATIENT)
Dept: RADIATION ONCOLOGY | Facility: HOSPITAL | Age: 61
Discharge: HOME | End: 2021-09-16

## 2021-09-16 PROCEDURE — 77386: CPT | Performed by: RADIOLOGY

## 2021-09-16 NOTE — PROGRESS NOTES
ONC Nutrition    Diagnosis:  Small cell lung cancer / bronchoscopy on 3/17/2021 revealed metastatic small cell lung cancer in station 4R and 11 R lymph nodes / biopsies of station 11 L and 7 were negative for malignant cells / T1BN1 stage IIb     Surgery:  Right thoracotomy and lymph node sampling in September 2019      Radiation:  60 Gray in 30 fractions / patient did not complete treatment - originally was getting concurrent chemoradiotherapy but went into respiratory failure -  returns now for radiotherapy alone    Weight 194.6 lbs / 15 lbs weight loss since May 2021 (7% weight loss)    Patient now with another upper respiratory tract infection as she proceeds with radiation.  Consulted with patient and her  during RAD ONC status checks regarding diminished appetite and oral intake.  Reviewed what she had been trying to eat and made suggestions for alternate choices that she may find appealing and willing to eat. Patient has lost approximately 4 lbs over the past two weeks.  Patient found suggestions appealing and will to try; her  is her main caregiver and very attentive to whatever she wants.

## 2021-09-17 ENCOUNTER — HOSPITAL ENCOUNTER (OUTPATIENT)
Dept: RADIATION ONCOLOGY | Facility: HOSPITAL | Age: 61
Discharge: HOME | End: 2021-09-17

## 2021-09-17 PROCEDURE — 77386: CPT | Performed by: RADIOLOGY

## 2021-09-17 PROCEDURE — 77336 RADIATION PHYSICS CONSULT: CPT | Performed by: RADIOLOGY

## 2021-09-20 ENCOUNTER — HOSPITAL ENCOUNTER (OUTPATIENT)
Dept: RADIATION ONCOLOGY | Facility: HOSPITAL | Age: 61
Discharge: HOME | End: 2021-09-20

## 2021-09-20 PROCEDURE — 77386: CPT | Performed by: RADIOLOGY

## 2021-09-21 ENCOUNTER — HOSPITAL ENCOUNTER (OUTPATIENT)
Dept: RADIATION ONCOLOGY | Facility: HOSPITAL | Age: 61
Discharge: HOME | End: 2021-09-21

## 2021-09-21 VITALS — BODY MASS INDEX: 273.22 KG/M2 | WEIGHT: 195.7 LBS

## 2021-09-21 PROCEDURE — 77386: CPT | Performed by: RADIOLOGY

## 2021-09-22 ENCOUNTER — DOCUMENTATION (OUTPATIENT)
Dept: NUTRITION | Facility: HOSPITAL | Age: 61
End: 2021-09-22

## 2021-09-22 ENCOUNTER — HOSPITAL ENCOUNTER (OUTPATIENT)
Dept: RADIATION ONCOLOGY | Facility: HOSPITAL | Age: 61
Discharge: HOME | End: 2021-09-22

## 2021-09-22 PROCEDURE — 77386: CPT | Performed by: RADIOLOGY

## 2021-09-22 NOTE — PROGRESS NOTES
ONC Nutrition     Diagnosis:  Small cell lung cancer / bronchoscopy on 3/17/2021 revealed metastatic small cell lung cancer in station 4R and 11 R lymph nodes / biopsies of station 11 L and 7 were negative for malignant cells / T1BN1 stage IIb     Surgery:  Right thoracotomy and lymph node sampling in September 2019      Radiation:  60 Gray in 30 fractions / patient did not complete treatment - originally was getting concurrent chemoradiotherapy but went into respiratory failure -  returns now for radiotherapy alone     Weight 195.8 lbs / > 1 lbs weight gain over the past week / 15 lbs weight loss since May 2021 (7% weight loss)     As evidenced in part by weight gain > 1 lb over the past week and recall of nutritional intake, patient has significantly improved her oral intake when compared to last week.  She also was prescribed an alternate antibiotic and prednisone which has probably also had an effect on improving her appetite. She and her  implemented many of the suggestions that we discussed last week and she found her food much more appealing.

## 2021-09-23 ENCOUNTER — HOSPITAL ENCOUNTER (OUTPATIENT)
Dept: RADIATION ONCOLOGY | Facility: HOSPITAL | Age: 61
Discharge: HOME | End: 2021-09-23

## 2021-09-23 PROCEDURE — 77386: CPT | Performed by: RADIOLOGY

## 2021-09-24 ENCOUNTER — HOSPITAL ENCOUNTER (OUTPATIENT)
Dept: RADIATION ONCOLOGY | Facility: HOSPITAL | Age: 61
Discharge: HOME | End: 2021-09-24

## 2021-09-24 PROCEDURE — 77336 RADIATION PHYSICS CONSULT: CPT | Performed by: RADIOLOGY

## 2021-09-24 PROCEDURE — 77386: CPT | Performed by: RADIOLOGY

## 2021-09-27 ENCOUNTER — HOSPITAL ENCOUNTER (OUTPATIENT)
Dept: RADIATION ONCOLOGY | Facility: HOSPITAL | Age: 61
Discharge: HOME | End: 2021-09-27

## 2021-09-27 PROCEDURE — 77386: CPT | Performed by: RADIOLOGY

## 2021-09-28 ENCOUNTER — HOSPITAL ENCOUNTER (OUTPATIENT)
Dept: RADIATION ONCOLOGY | Facility: HOSPITAL | Age: 61
Discharge: HOME | End: 2021-09-28

## 2021-09-28 VITALS — WEIGHT: 198.9 LBS | BODY MASS INDEX: 277.69 KG/M2

## 2021-09-28 PROCEDURE — 77386: CPT | Performed by: RADIOLOGY

## 2021-09-29 ENCOUNTER — HOSPITAL ENCOUNTER (OUTPATIENT)
Dept: RADIATION ONCOLOGY | Facility: HOSPITAL | Age: 61
Discharge: HOME | End: 2021-09-29

## 2021-09-29 PROCEDURE — 77386: CPT | Performed by: RADIOLOGY

## 2021-11-01 ENCOUNTER — HOSPITAL ENCOUNTER (OUTPATIENT)
Dept: RADIATION ONCOLOGY | Facility: HOSPITAL | Age: 61
Setting detail: RADIATION/ONCOLOGY SERIES
Discharge: HOME | End: 2021-11-01

## 2021-11-01 ENCOUNTER — OFFICE VISIT (OUTPATIENT)
Dept: RADIATION ONCOLOGY | Facility: HOSPITAL | Age: 61
End: 2021-11-01

## 2021-11-01 DIAGNOSIS — C34.81 MALIGNANT NEOPLASM OF OVERLAPPING SITES OF RIGHT LUNG (HCC): Primary | ICD-10-CM

## 2021-11-01 NOTE — PROGRESS NOTES
TELEMEDICINE FOLLOW UP NOTE    PATIENT:                                                      Fabiana Brody  MEDICAL RECORD #:                        [id number]  :                                                          1960  COMPLETION DATE:   2021  DIAGNOSIS:     Limited stage small cell carcinoma of the lung,   - T1bN1 Stage IIB    Squamous cell carcinoma of lung, stage I, right (HCC)    This visit has been rescheduled as a phone visit to comply with patient safety concerns in accordance with CDC recommendations in light of the COVID-19 pandemic and at the request of the patient.  Total time of discussion was 17 minutes.  Verbal consent given.    COVID-19 RISK SCREEN     1. Has the patient had close contact without PPE with a lab confirmed COVID-19 (+) person or a person under investigation (PUI) for COVID-19 infection?  -- No     2. Has the patient had respiratory symptoms, worsened/new cough and/or SOA, unexplained fever, or sudden loss of smell and/or taste in the past 7 days? --  No    3. Does the patient have baseline higher exposure risk such as working in healthcare field or currently residing in healthcare facility?  --  No           BRIEF HISTORY:  Fabiana Brody is a 61 y.o. female who presents today via telemedicine for a recent diagnosis of limited stage small cell lung carcinoma.  She has a remote history of moderately differentiated squamous cell carcinoma of the right lower lobe of lung, status post right thoracotomy and lymph node sampling in 2019.  More recently, she was found to have an 11 mm lobulated right upper lobe nodule identified on CT scan of the chest and concerning for malignancy.  PET/CT scan 2021 demonstrated abnormal hypermetabolism in the bilateral axilla and posterior right lung mass, as well as increased uptake in the hilar regions felt to likely be reactive.  FNA biopsy of the lung mass demonstrated small cell carcinoma.  Chest wall mass was biopsied and  nondiagnostic.  Bronchoscopy on 3/17/2021 revealed metastatic small cell lung cancer in station 4R and 11 R lymph nodes.  Biopsies of station 11 L and 7 were negative for malignant cells.  The axillary adenopathy was felt to be inflammatory.  MRI of the brain was negative for metastatic disease.  She was started on definitive treatment with concurrent chemoradiotherapy.  Initially, the primary lung mass was treated to a dose of 16 Gy in 8 fractions.  Treatment was interrupted for a hospitalization x several weeks in May related to acute respiratory failure, followed by a prolonged rehabilitation stay.  Her last dose of chemotherapy was 5/18/2021.  Repeat PET/CT scan 7/30/2021 showed improvement in the treated region of the chest, with questionable activity in the left sternoclavicular joint and no evidence of metastatic disease elsewhere.  She then returned for radiotherapy alone and was replanned.  She resumed treatment to the right lung mass to an additional 44 Gy in 22 fractions to complete a cumulative dose of 60 Gy.  She reports grade 1 fatigue continues to subside.  She remains on continuous supplemental O2 at 3.5 L, and notes stable shortness of breath and exertional dyspnea.  She reports intermittent cough with decreased sputum production.  She denies hemoptysis, esophagitis, radiation dermatitis, weight loss, fever, or chills.  She does report occasional chest wall pain that is self-limited.  She also reports some generalized pain in the setting of RA.  She denies headache, nausea, focal weakness or neurologic deficit.  Overall, she reports she is quite weak.  She is independent with certain ADLs such as dressing and toileting, though requires assistance with cooking and bathing.  She gets around in a wheelchair and has a lift chair in her living room.  She denies additional or acute concerns today.        MEDICATIONS: Medication reconciliation for the patient was reviewed and confirmed in the electronic  medical record.    Review of Systems   Constitutional: Positive for fatigue.   Respiratory: Positive for cough and shortness of breath.    Cardiovascular: Positive for chest pain.   Musculoskeletal: Positive for gait problem (wheelchair bound).   Neurological: Positive for dizziness and gait problem (wheelchair bound).   Psychiatric/Behavioral: The patient is nervous/anxious.    All other systems reviewed and are negative.      KPS  70%        Physical Exam  Pulmonary:      Respirations even, unlabored. No audible wheezing or cough.  Neurological:      A+Ox4, conversant, answers questions appropriately.  Psychiatric:     Judgement, affect, and decision-making WNL.    Limited physical exam as visit was conducted remotely via telephone in light of the COVID-19 pandemic.        The following portions of the patient's history were reviewed and updated as appropriate: allergies, current medications, past family history, past medical history, past social history, past surgical history and problem list.         Diagnoses and all orders for this visit:    1. Malignant neoplasm of overlapping sites of right lung (HCC) (Primary)         IMPRESSION:  Fabiana Brody is a 61 y.o. female with recent diagnosis of T1bN1 stage IIB limited stage small cell carcinoma of the right upper lobe of lung.  She had difficulties and interruptions with her concurrent chemoradiation therapy course, and chemo was subsequently discontinued in May 2021.  She ultimately did complete a definitive dose of thoracic radiation despite a prolonged break in treatment requiring restaging/replanning.  She seems to be without acute residual radiation-related toxicities at this point, and grade 1 fatigue continues to stacey.  She will be due for repeat restaging imaging to evaluate response to treatment, which can continue to be arranged through her primary medical oncologist.  She is scheduled to see Dr. Gary 11/2/2021.  We did review follow-up intervals,  repeat imaging, and expectations for response to treatment.  We briefly discussed the role of PCI for patients with limited stage small cell lung cancer who are good candidates for prophylactic treatment.  Even if repeat imaging demonstrates partial or complete response within the chest and no evidence of metastatic disease elsewhere, she likely remains a borderline candidate for PCI given her poor performance status as she is oxygen-dependent, not readily mobile, and not fully independent with ADLs.  Appreciate Dr. Gary's input on this matter.    RECOMMENDATIONS: I will schedule the patient to return in approximately 4 weeks, following her appointment with Dr. Gary and restaging scans.  We will readdress the potential for PCI at that time.    Return in about 1 month (around 12/1/2021) for Office Visit.    Bryce Glasgow, APRN

## 2021-12-01 ENCOUNTER — HOSPITAL ENCOUNTER (OUTPATIENT)
Dept: RADIATION ONCOLOGY | Facility: HOSPITAL | Age: 61
Setting detail: RADIATION/ONCOLOGY SERIES
Discharge: HOME | End: 2021-12-01

## 2021-12-01 ENCOUNTER — OFFICE VISIT (OUTPATIENT)
Dept: RADIATION ONCOLOGY | Facility: HOSPITAL | Age: 61
End: 2021-12-01

## 2021-12-01 VITALS
SYSTOLIC BLOOD PRESSURE: 163 MMHG | DIASTOLIC BLOOD PRESSURE: 66 MMHG | RESPIRATION RATE: 16 BRPM | BODY MASS INDEX: 42.95 KG/M2 | WEIGHT: 199.1 LBS | HEIGHT: 57 IN | OXYGEN SATURATION: 96 % | HEART RATE: 88 BPM | TEMPERATURE: 97.3 F

## 2021-12-01 DIAGNOSIS — C34.90 MALIGNANT NEOPLASM OF BRONCHUS AND LUNG (HCC): Primary | ICD-10-CM

## 2021-12-01 PROCEDURE — G0463 HOSPITAL OUTPT CLINIC VISIT: HCPCS

## (undated) DEVICE — SUCTION CANISTER, 2500CC, RIGID: Brand: DEROYAL

## (undated) DEVICE — DRSNG WND GZ PAD BORDERED 4X8IN STRL

## (undated) DEVICE — SPNG GZ STRL 2S 4X4 12PLY

## (undated) DEVICE — CONN REDUCING CANN/PUMP 3/8X3/8X3/8

## (undated) DEVICE — SPNG GZ WOVN 4X4IN 12PLY 10/BX STRL

## (undated) DEVICE — CLTH CLENS READYCLEANSE PERI CARE PK/5

## (undated) DEVICE — TRAP,MUCUS SPECIMEN,40CC: Brand: MEDLINE

## (undated) DEVICE — SUT VIC 4/0 SH 27IN J415H

## (undated) DEVICE — UNDYED BRAIDED (POLYGLACTIN 910), SYNTHETIC ABSORBABLE SUTURE: Brand: COATED VICRYL

## (undated) DEVICE — CVR HNDL LT SURG ACCSSRY BLU STRL

## (undated) DEVICE — GLV SURG PREMIERPRO MIC LTX PF SZ7 BRN

## (undated) DEVICE — SUT PROLN 3/0 SH D/A 36IN 8522H

## (undated) DEVICE — PK THORACOTOMY 10

## (undated) DEVICE — TOTAL TRAY, 16FR 10ML SIL FOLEY, URN: Brand: MEDLINE

## (undated) DEVICE — 32 FR STRAIGHT – SOFT PVC CATHETER: Brand: PVC THORACIC CATHETERS

## (undated) DEVICE — SAFESECURE,SECUREMENT,FOLEY CATH,STERILE: Brand: MEDLINE

## (undated) DEVICE — 3M™ MEDIPORE™ H SOFT CLOTH SURGICAL TAPE, 2863, 3 IN X 10 YD, 12/CASE: Brand: 3M™ MEDIPORE™

## (undated) DEVICE — SUT VIC 1 CTX 36IN OBGYN VCP977H